# Patient Record
Sex: MALE | Race: WHITE | Employment: FULL TIME
[De-identification: names, ages, dates, MRNs, and addresses within clinical notes are randomized per-mention and may not be internally consistent; named-entity substitution may affect disease eponyms.]

---

## 2017-01-24 ENCOUNTER — TELEPHONE (OUTPATIENT)
Facility: CLINIC | Age: 47
End: 2017-01-24

## 2017-02-09 ENCOUNTER — OFFICE VISIT (OUTPATIENT)
Facility: CLINIC | Age: 47
End: 2017-02-09

## 2017-02-09 VITALS
OXYGEN SATURATION: 98 % | BODY MASS INDEX: 30.29 KG/M2 | WEIGHT: 223.6 LBS | DIASTOLIC BLOOD PRESSURE: 82 MMHG | SYSTOLIC BLOOD PRESSURE: 130 MMHG | HEART RATE: 76 BPM | TEMPERATURE: 97.4 F

## 2017-02-09 DIAGNOSIS — Z79.4 TYPE 2 DIABETES MELLITUS WITH DIABETIC NEUROPATHY, WITH LONG-TERM CURRENT USE OF INSULIN (HCC): Primary | ICD-10-CM

## 2017-02-09 DIAGNOSIS — E66.9 OBESITY (BMI 30.0-34.9): ICD-10-CM

## 2017-02-09 DIAGNOSIS — E55.9 VITAMIN D DEFICIENCY: ICD-10-CM

## 2017-02-09 DIAGNOSIS — E11.40 TYPE 2 DIABETES MELLITUS WITH DIABETIC NEUROPATHY, WITH LONG-TERM CURRENT USE OF INSULIN (HCC): Primary | ICD-10-CM

## 2017-02-09 DIAGNOSIS — E78.5 DYSLIPIDEMIA: ICD-10-CM

## 2017-02-09 PROCEDURE — 4004F PT TOBACCO SCREEN RCVD TLK: CPT | Performed by: FAMILY MEDICINE

## 2017-02-09 PROCEDURE — G8419 CALC BMI OUT NRM PARAM NOF/U: HCPCS | Performed by: FAMILY MEDICINE

## 2017-02-09 PROCEDURE — G8427 DOCREV CUR MEDS BY ELIG CLIN: HCPCS | Performed by: FAMILY MEDICINE

## 2017-02-09 PROCEDURE — G8484 FLU IMMUNIZE NO ADMIN: HCPCS | Performed by: FAMILY MEDICINE

## 2017-02-09 PROCEDURE — 99214 OFFICE O/P EST MOD 30 MIN: CPT | Performed by: FAMILY MEDICINE

## 2017-02-09 PROCEDURE — 3045F PR MOST RECENT HEMOGLOBIN A1C LEVEL 7.0-9.0%: CPT | Performed by: FAMILY MEDICINE

## 2017-02-12 DIAGNOSIS — E11.40 TYPE 2 DIABETES MELLITUS WITH DIABETIC NEUROPATHY, WITH LONG-TERM CURRENT USE OF INSULIN (HCC): ICD-10-CM

## 2017-02-12 DIAGNOSIS — Z79.4 TYPE 2 DIABETES MELLITUS WITH DIABETIC NEUROPATHY, WITH LONG-TERM CURRENT USE OF INSULIN (HCC): ICD-10-CM

## 2017-02-13 RX ORDER — EMPAGLIFLOZIN AND LINAGLIPTIN 10; 5 MG/1; MG/1
TABLET, FILM COATED ORAL
Qty: 30 TABLET | Refills: 3 | Status: SHIPPED | OUTPATIENT
Start: 2017-02-13 | End: 2017-06-12 | Stop reason: SDUPTHER

## 2017-06-12 DIAGNOSIS — Z79.4 TYPE 2 DIABETES MELLITUS WITH DIABETIC NEUROPATHY, WITH LONG-TERM CURRENT USE OF INSULIN (HCC): ICD-10-CM

## 2017-06-12 DIAGNOSIS — E11.40 TYPE 2 DIABETES MELLITUS WITH DIABETIC NEUROPATHY, WITH LONG-TERM CURRENT USE OF INSULIN (HCC): ICD-10-CM

## 2017-06-12 RX ORDER — EMPAGLIFLOZIN AND LINAGLIPTIN 10; 5 MG/1; MG/1
TABLET, FILM COATED ORAL
Qty: 30 TABLET | Refills: 3 | Status: SHIPPED | OUTPATIENT
Start: 2017-06-12 | End: 2017-06-14 | Stop reason: SDUPTHER

## 2017-10-19 ENCOUNTER — HOSPITAL ENCOUNTER (OUTPATIENT)
Age: 47
Setting detail: SPECIMEN
Discharge: HOME | End: 2017-10-19
Payer: COMMERCIAL

## 2017-10-19 LAB
CREATININE URINE: 41.3 MG/DL (ref 39–259)
MICROALBUMIN/CREAT 24H UR: <12 MG/L
MICROALBUMIN/CREAT UR-RTO: 29 MCG/MG CREAT

## 2018-05-29 ENCOUNTER — HOSPITAL ENCOUNTER (OUTPATIENT)
Age: 48
Setting detail: SPECIMEN
Discharge: HOME | End: 2018-05-29
Payer: COMMERCIAL

## 2018-05-29 ENCOUNTER — HOSPITAL ENCOUNTER (OUTPATIENT)
Facility: CLINIC | Age: 48
Discharge: HOME | End: 2018-05-31
Payer: COMMERCIAL

## 2018-05-29 ENCOUNTER — HOSPITAL ENCOUNTER (OUTPATIENT)
Dept: GENERAL RADIOLOGY | Facility: CLINIC | Age: 48
Discharge: HOME | End: 2018-05-31
Payer: COMMERCIAL

## 2018-05-29 DIAGNOSIS — L03.012 CELLULITIS OF FINGER OF LEFT HAND: ICD-10-CM

## 2018-05-29 LAB
ABSOLUTE EOS #: 0.03 K/UL (ref 0–0.44)
ABSOLUTE IMMATURE GRANULOCYTE: <0.03 K/UL (ref 0–0.3)
ABSOLUTE LYMPH #: 1.34 K/UL (ref 1.1–3.7)
ABSOLUTE MONO #: 0.65 K/UL (ref 0.1–1.2)
BASOPHILS # BLD: 0 % (ref 0–2)
BASOPHILS ABSOLUTE: <0.03 K/UL (ref 0–0.2)
DIFFERENTIAL TYPE: ABNORMAL
EOSINOPHILS RELATIVE PERCENT: 0 % (ref 1–4)
HCT VFR BLD CALC: 46.8 % (ref 40.7–50.3)
HEMOGLOBIN: 15.3 G/DL (ref 13–17)
IMMATURE GRANULOCYTES: 0 %
LYMPHOCYTES # BLD: 19 % (ref 24–43)
MCH RBC QN AUTO: 28.1 PG (ref 25.2–33.5)
MCHC RBC AUTO-ENTMCNC: 32.7 G/DL (ref 28.4–34.8)
MCV RBC AUTO: 85.9 FL (ref 82.6–102.9)
MONOCYTES # BLD: 9 % (ref 3–12)
NRBC AUTOMATED: 0 PER 100 WBC
PDW BLD-RTO: 13.1 % (ref 11.8–14.4)
PLATELET # BLD: 145 K/UL (ref 138–453)
PLATELET ESTIMATE: ABNORMAL
PMV BLD AUTO: 11.6 FL (ref 8.1–13.5)
RBC # BLD: 5.45 M/UL (ref 4.21–5.77)
RBC # BLD: ABNORMAL 10*6/UL
SEG NEUTROPHILS: 72 % (ref 36–65)
SEGMENTED NEUTROPHILS ABSOLUTE COUNT: 5.13 K/UL (ref 1.5–8.1)
URIC ACID: 5.9 MG/DL (ref 3.4–7)
WBC # BLD: 7.2 K/UL (ref 3.5–11.3)
WBC # BLD: ABNORMAL 10*3/UL

## 2018-05-29 PROCEDURE — 73140 X-RAY EXAM OF FINGER(S): CPT

## 2018-11-29 ENCOUNTER — HOSPITAL ENCOUNTER (OUTPATIENT)
Age: 48
Setting detail: SPECIMEN
Discharge: HOME | End: 2018-11-29
Payer: COMMERCIAL

## 2018-11-29 DIAGNOSIS — E78.5 DYSLIPIDEMIA: ICD-10-CM

## 2018-11-29 DIAGNOSIS — Z12.5 PROSTATE CANCER SCREENING: ICD-10-CM

## 2018-11-29 LAB
ALBUMIN SERPL-MCNC: 4.3 G/DL (ref 3.5–5.2)
ALBUMIN/GLOBULIN RATIO: 1.6 (ref 1–2.5)
ALP BLD-CCNC: 99 U/L (ref 40–129)
ALT SERPL-CCNC: 18 U/L (ref 5–41)
AST SERPL-CCNC: 17 U/L
BILIRUB SERPL-MCNC: 0.3 MG/DL (ref 0.3–1.2)
BILIRUBIN DIRECT: <0.08 MG/DL
BILIRUBIN, INDIRECT: NORMAL MG/DL (ref 0–1)
GLOBULIN: NORMAL G/DL (ref 1.5–3.8)
TOTAL PROTEIN: 7 G/DL (ref 6.4–8.3)

## 2018-11-30 LAB — PROSTATE SPECIFIC ANTIGEN: 0.82 UG/L

## 2019-07-29 ENCOUNTER — HOSPITAL ENCOUNTER (OUTPATIENT)
Age: 49
Setting detail: SPECIMEN
Discharge: HOME | End: 2019-07-29
Payer: COMMERCIAL

## 2019-07-29 LAB
CREATININE URINE: 41.3 MG/DL (ref 39–259)
MICROALBUMIN/CREAT 24H UR: <12 MG/L
MICROALBUMIN/CREAT UR-RTO: NORMAL MCG/MG CREAT

## 2020-12-09 ENCOUNTER — HOSPITAL ENCOUNTER (OUTPATIENT)
Age: 50
Setting detail: SPECIMEN
Discharge: HOME | End: 2020-12-09
Payer: COMMERCIAL

## 2020-12-09 ENCOUNTER — OFFICE VISIT (OUTPATIENT)
Dept: PRIMARY CARE CLINIC | Age: 50
End: 2020-12-09
Payer: COMMERCIAL

## 2020-12-09 VITALS
OXYGEN SATURATION: 95 % | BODY MASS INDEX: 27.63 KG/M2 | TEMPERATURE: 97.2 F | HEART RATE: 87 BPM | WEIGHT: 204 LBS | HEIGHT: 72 IN

## 2020-12-09 PROCEDURE — 3017F COLORECTAL CA SCREEN DOC REV: CPT | Performed by: FAMILY MEDICINE

## 2020-12-09 PROCEDURE — G8482 FLU IMMUNIZE ORDER/ADMIN: HCPCS | Performed by: FAMILY MEDICINE

## 2020-12-09 PROCEDURE — G8417 CALC BMI ABV UP PARAM F/U: HCPCS | Performed by: FAMILY MEDICINE

## 2020-12-09 PROCEDURE — 99213 OFFICE O/P EST LOW 20 MIN: CPT | Performed by: FAMILY MEDICINE

## 2020-12-09 PROCEDURE — G8427 DOCREV CUR MEDS BY ELIG CLIN: HCPCS | Performed by: FAMILY MEDICINE

## 2020-12-09 PROCEDURE — 4004F PT TOBACCO SCREEN RCVD TLK: CPT | Performed by: FAMILY MEDICINE

## 2020-12-09 ASSESSMENT — ENCOUNTER SYMPTOMS
ABDOMINAL PAIN: 0
VOMITING: 0
WHEEZING: 0
COUGH: 1
SORE THROAT: 1
SHORTNESS OF BREATH: 0
RHINORRHEA: 0
DIARRHEA: 1
CHANGE IN BOWEL HABIT: 1

## 2020-12-09 ASSESSMENT — PATIENT HEALTH QUESTIONNAIRE - PHQ9
SUM OF ALL RESPONSES TO PHQ QUESTIONS 1-9: 0
1. LITTLE INTEREST OR PLEASURE IN DOING THINGS: 0
SUM OF ALL RESPONSES TO PHQ QUESTIONS 1-9: 0
SUM OF ALL RESPONSES TO PHQ QUESTIONS 1-9: 0
2. FEELING DOWN, DEPRESSED OR HOPELESS: 0
SUM OF ALL RESPONSES TO PHQ9 QUESTIONS 1 & 2: 0

## 2020-12-09 NOTE — PROGRESS NOTES
[address] CLINIC  [facility] [address] [address]  Dept: [phone]  Dept Fax: [phone]    Alcides Poe is a 44 y.o. male who presents today for his medical conditions/complaintsas noted below. Alcides Poe is c/o of Concern For COVID-19 (sore throat,congestion,diarrhea,fatigue,x3 days,no exposure)        HPI:     Pharyngitis   This is a new problem. The current episode started in the past 7 days (3 days). The problem occurs constantly. The problem has been unchanged. Associated symptoms include a change in bowel habit, congestion, coughing, fatigue, a fever, headaches and a sore throat. Pertinent negatives include no abdominal pain, chills, myalgias, rash or vomiting. Nothing aggravates the symptoms. He has tried acetaminophen (nyquil) for the symptoms. The treatment provided mild relief. No known COVID contacts  PMHx of DM  Past Medical History:   Diagnosis Date    Diabetic peripheral neuropathy ([facility] [address] 1/14/2014    Type II or unspecified type diabetes mellitus without mention of complication, not stated as uncontrolled     Past medical history reviewed and pertinent positives/negatives in the HPI    Past Surgical History:   Procedure Laterality Date    HAND REPLANTATION Left 6/7/89       Family History   Problem Relation Age of Onset    Diabetes Mother     Heart Disease Father     Cancer Father     Diabetes Maternal Grandmother        Social History     Tobacco Use    Smoking status: Never Smoker    Smokeless tobacco: Current User     Types: Chew    Tobacco comment: patient is not ready to quit   Substance Use Topics    Alcohol use:  Yes     Alcohol/week: 0.0 standard drinks     Comment: ossacionally      Current Outpatient Medications   Medication Sig Dispense Refill    Empagliflozin-linaGLIPtin (GLYXAMBI) 10-5 MG TABS take 1 tablet by mouth once daily 30 tablet 2    Insulin Glargine, 2 Unit Dial, (TOUJEO MAX SOLOSTAR) 300 UNIT/ML SOPN Inject 75 Units into the skin daily 3 pen 3    dapagliflozin (FARXIGA) 10 MG tablet Take 1 tablet by mouth every morning 90 tablet 1    tiZANidine (ZANAFLEX) 4 MG tablet Take 1 tablet by mouth nightly as needed (muscle spasms) 30 tablet 2    ondansetron (ZOFRAN ODT) 4 MG disintegrating tablet Take 1 tablet by mouth every 8 hours as needed for Nausea or Vomiting 30 tablet 0    pregabalin (LYRICA) 50 MG capsule Take 1 capsule by mouth 3 times daily 90 capsule 0    Insulin Syringe-Needle U-100 31G X 5/16\" 1 ML MISC 1 each by Does not apply route daily. 100 each 3    Insulin Pen Needle 29G X 12.7MM MISC 1 each by Does not apply route daily. 100 each 3    pregabalin (LYRICA) 50 MG capsule Take 1 capsule by mouth 3 times daily for 14 days. 42 capsule 0     No current facility-administered medications for this visit. No Known Allergies    Health Maintenance   Topic Date Due    Diabetic retinal exam  05/25/2017    Lipid screen  05/25/2017    Colon cancer screen colonoscopy  06/30/2018    Diabetic microalbuminuria test  07/29/2020    A1C test (Diabetic or Prediabetic)  12/16/2020    DTaP/Tdap/Td vaccine (1 - Tdap) 02/12/2021 (Originally 6/30/1987)    Hepatitis B vaccine (1 of 3 - Risk 3-dose series) 09/15/2021 (Originally [birth year]/30/1987)    Shingles Vaccine (1 of 2) 09/15/2021 (Originally 6/30/2018)    Diabetic foot exam  09/16/2021    Flu vaccine  Completed    HIV screen  Addressed    Hepatitis A vaccine  Aged Out    Hib vaccine  Aged Out    Meningococcal (ACWY) vaccine  Aged Out    Pneumococcal 0-64 years Vaccine  Aged Out       :      Review of Systems   Constitutional: Positive for fatigue and fever. Negative for chills. HENT: Positive for congestion and sore throat. Negative for ear pain and rhinorrhea. Respiratory: Positive for cough. Negative for shortness of breath and wheezing. Gastrointestinal: Positive for change in bowel habit and diarrhea.  Negative for abdominal pain and vomiting. Musculoskeletal: Negative for myalgias. Skin: Negative for pallor and rash. Neurological: Positive for headaches. Hematological: Negative for adenopathy. Objective:     Physical Exam  Vitals signs and nursing note reviewed. Constitutional:       Appearance: Normal appearance. HENT:      Head: Normocephalic and atraumatic. Right Ear: Hearing normal.      Left Ear: Hearing normal.      Nose: Nose normal.      Mouth/Throat:      Lips: Pink. Mouth: Mucous membranes are moist.      Pharynx: Oropharynx is clear. Eyes:      Extraocular Movements: Extraocular movements intact. Conjunctiva/sclera: Conjunctivae normal.   Neck:      Musculoskeletal: Normal range of motion. Cardiovascular:      Rate and Rhythm: Normal rate and regular rhythm. Heart sounds: Normal heart sounds. Pulmonary:      Effort: Pulmonary effort is normal.      Breath sounds: Normal breath sounds. Skin:     General: Skin is warm and dry. Neurological:      Mental Status: He is alert and oriented to person, place, and time. Mental status is at baseline. Psychiatric:         Mood and Affect: Mood normal.         Behavior: Behavior normal.         Thought Content: Thought content normal.         Judgment: Judgment normal.       Pulse 87   Temp 97.2 °F (36.2 °C) (Infrared)   Ht 6' (1.829 m)   Wt 204 lb (92.5 kg)   SpO2 95%   BMI 27.67 kg/m²     Assessment:       Diagnosis Orders   1. Suspected COVID-19 virus infection  COVID-19 Ambulatory   2. Viral illness         Plan: You were tested for COVID today. We will call you with results when they are available. If you have not heard from us in 7 days, please call our office. Advance Care Planning  People with COVID-19 may have no symptoms, mild symptoms, such as fever, cough, and shortness of breath or they may have more severe illness, developing severe and fatal pneumonia.   As a result, Advance Care Planning with attention to naming a health care decision maker (someone you trust to make healthcare decisions for you if you could not speak for yourself) and sharing other health care preferences is important BEFORE a possible health crisis. Please contact your Primary Care Provider to discuss Advance Care Planning. Preventing the Spread of Coronavirus Disease 2019 in Homes and Residential Communities  For the most recent information go to [email or web address]    Prevention steps for People with confirmed or suspected COVID-19 (including persons under investigation) who do not need to be hospitalized  and   People with confirmed COVID-19 who were hospitalized and determined to be medically stable to go home    Your healthcare provider and public health staff will evaluate whether you can be cared for at home. If it is determined that you do not need to be hospitalized and can be isolated at home, you will be monitored by staff from your local or state health department. You should follow the prevention steps below until a healthcare provider or local or state health department says you can return to your normal activities. Stay home except to get medical care  People who are mildly ill with COVID-19 are able to isolate at home during their illness. You should restrict activities outside your home, except for getting medical care. Do not go to work, school, or public areas. Avoid using public transportation, ride-sharing, or taxis. Separate yourself from other people and animals in your home  People: As much as possible, you should stay in a specific room and away from other people in your home. Also, you should use a separate bathroom, if available. Animals: You should restrict contact with pets and other animals while you are sick with COVID-19, just like you would around other people.  Although there have not been reports of pets or other animals becoming sick with COVID-19, it is still recommended that people sick with COVID-19 limit contact with animals until more information is known about the virus. When possible, have another member of your household care for your animals while you are sick. If you are sick with COVID-19, avoid contact with your pet, including petting, snuggling, being kissed or licked, and sharing food. If you must care for your pet or be around animals while you are sick, wash your hands before and after you interact with pets and wear a facemask. Call ahead before visiting your doctor  If you have a medical appointment, call the healthcare provider and tell them that you have or may have COVID-19. This will help the healthcare providers office take steps to keep other people from getting infected or exposed. Wear a facemask  You should wear a facemask when you are around other people (e.g., sharing a room or vehicle) or pets and before you enter a healthcare providers office. If you are not able to wear a facemask (for example, because it causes trouble breathing), then people who live with you should not stay in the same room with you, or they should wear a facemask if they enter your room. Cover your coughs and sneezes  Cover your mouth and nose with a tissue when you cough or sneeze. Throw used tissues in a lined trash can. Immediately wash your hands with soap and water for at least 20 seconds or, if soap and water are not available, clean your hands with an alcohol-based hand  that contains at least 60% alcohol. Clean your hands often  Wash your hands often with soap and water for at least 20 seconds, especially after blowing your nose, coughing, or sneezing; going to the bathroom; and before eating or preparing food. If soap and water are not readily available, use an alcohol-based hand  with at least 60% alcohol, covering all surfaces of your hands and rubbing them together until they feel dry.   Soap and water are the best option if hands are visibly dirty. Avoid touching your eyes, nose, and mouth with unwashed hands. Avoid sharing personal household items  You should not share dishes, drinking glasses, cups, eating utensils, towels, or bedding with other people or pets in your home. After using these items, they should be washed thoroughly with soap and water. Clean all high-touch surfaces everyday  High touch surfaces include counters, tabletops, doorknobs, bathroom fixtures, toilets, phones, keyboards, tablets, and bedside tables. Also, clean any surfaces that may have blood, stool, or body fluids on them. Use a household cleaning spray or wipe, according to the label instructions. Labels contain instructions for safe and effective use of the cleaning product including precautions you should take when applying the product, such as wearing gloves and making sure you have good ventilation during use of the product. Monitor your symptoms  Seek prompt medical attention if your illness is worsening (e.g., difficulty breathing). Before seeking care, call your healthcare provider and tell them that you have, or are being evaluated for, COVID-19. Put on a facemask before you enter the facility. These steps will help the healthcare providers office to keep other people in the office or waiting room from getting infected or exposed. Ask your healthcare provider to call the local or state health department. Persons who are placed under active monitoring or facilitated self-monitoring should follow instructions provided by their local health department or occupational health professionals, as appropriate. When working with your local health department check their available hours. If you have a medical emergency and need to call 911, notify the dispatch personnel that you have, or are being evaluated for COVID-19. If possible, put on a facemask before emergency medical services arrive.   Discontinuing home isolation  Patients with confirmed COVID-19 should

## 2020-12-09 NOTE — PATIENT INSTRUCTIONS
You were tested for COVID today. We will call you with results when they are available. If you have not heard from us in 7 days, please call our office. Advance Care Planning  People with COVID-19 may have no symptoms, mild symptoms, such as fever, cough, and shortness of breath or they may have more severe illness, developing severe and fatal pneumonia. As a result, Advance Care Planning with attention to naming a health care decision maker (someone you trust to make healthcare decisions for you if you could not speak for yourself) and sharing other health care preferences is important BEFORE a possible health crisis. Please contact your Primary Care Provider to discuss Advance Care Planning. Preventing the Spread of Coronavirus Disease 2019 in Homes and Residential Communities  For the most recent information go to [email or web address]    Prevention steps for People with confirmed or suspected COVID-19 (including persons under investigation) who do not need to be hospitalized  and   People with confirmed COVID-19 who were hospitalized and determined to be medically stable to go home    Your healthcare provider and public health staff will evaluate whether you can be cared for at home. If it is determined that you do not need to be hospitalized and can be isolated at home, you will be monitored by staff from your local or state health department. You should follow the prevention steps below until a healthcare provider or local or state health department says you can return to your normal activities. Stay home except to get medical care  People who are mildly ill with COVID-19 are able to isolate at home during their illness. You should restrict activities outside your home, except for getting medical care. Do not go to work, school, or public areas. Avoid using public transportation, ride-sharing, or taxis.   Separate yourself from other people and animals in your home  People: As much as possible, you should stay in a specific room and away from other people in your home. Also, you should use a separate bathroom, if available. Animals: You should restrict contact with pets and other animals while you are sick with COVID-19, just like you would around other people. Although there have not been reports of pets or other animals becoming sick with COVID-19, it is still recommended that people sick with COVID-19 limit contact with animals until more information is known about the virus. When possible, have another member of your household care for your animals while you are sick. If you are sick with COVID-19, avoid contact with your pet, including petting, snuggling, being kissed or licked, and sharing food. If you must care for your pet or be around animals while you are sick, wash your hands before and after you interact with pets and wear a facemask. Call ahead before visiting your doctor  If you have a medical appointment, call the healthcare provider and tell them that you have or may have COVID-19. This will help the healthcare providers office take steps to keep other people from getting infected or exposed. Wear a facemask  You should wear a facemask when you are around other people (e.g., sharing a room or vehicle) or pets and before you enter a healthcare providers office. If you are not able to wear a facemask (for example, because it causes trouble breathing), then people who live with you should not stay in the same room with you, or they should wear a facemask if they enter your room. Cover your coughs and sneezes  Cover your mouth and nose with a tissue when you cough or sneeze. Throw used tissues in a lined trash can. Immediately wash your hands with soap and water for at least 20 seconds or, if soap and water are not available, clean your hands with an alcohol-based hand  that contains at least 60% alcohol.   Clean your hands often  Wash your hands often with soap and water for at least 20 seconds, especially after blowing your nose, coughing, or sneezing; going to the bathroom; and before eating or preparing food. If soap and water are not readily available, use an alcohol-based hand  with at least 60% alcohol, covering all surfaces of your hands and rubbing them together until they feel dry. Soap and water are the best option if hands are visibly dirty. Avoid touching your eyes, nose, and mouth with unwashed hands. Avoid sharing personal household items  You should not share dishes, drinking glasses, cups, eating utensils, towels, or bedding with other people or pets in your home. After using these items, they should be washed thoroughly with soap and water. Clean all high-touch surfaces everyday  High touch surfaces include counters, tabletops, doorknobs, bathroom fixtures, toilets, phones, keyboards, tablets, and bedside tables. Also, clean any surfaces that may have blood, stool, or body fluids on them. Use a household cleaning spray or wipe, according to the label instructions. Labels contain instructions for safe and effective use of the cleaning product including precautions you should take when applying the product, such as wearing gloves and making sure you have good ventilation during use of the product. Monitor your symptoms  Seek prompt medical attention if your illness is worsening (e.g., difficulty breathing). Before seeking care, call your healthcare provider and tell them that you have, or are being evaluated for, COVID-19. Put on a facemask before you enter the facility. These steps will help the healthcare providers office to keep other people in the office or waiting room from getting infected or exposed. Ask your healthcare provider to call the local or state health department.  Persons who are placed under active monitoring or facilitated self-monitoring should follow instructions provided by their

## 2020-12-13 LAB — SARS-COV-2, NAA: DETECTED

## 2020-12-14 ENCOUNTER — TELEPHONE (OUTPATIENT)
Dept: ADMINISTRATIVE | Age: 50
End: 2020-12-14

## 2020-12-14 ENCOUNTER — TELEPHONE (OUTPATIENT)
Dept: PRIMARY CARE CLINIC | Age: 50
End: 2020-12-14

## 2021-03-23 ENCOUNTER — IMMUNIZATION (OUTPATIENT)
Dept: PRIMARY CARE CLINIC | Age: 51
End: 2021-03-23
Payer: COMMERCIAL

## 2021-03-23 PROCEDURE — 91300 COVID-19, PFIZER VACCINE 30MCG/0.3ML DOSE: CPT | Performed by: INTERNAL MEDICINE

## 2021-03-23 PROCEDURE — 0001A COVID-19, PFIZER VACCINE 30MCG/0.3ML DOSE: CPT | Performed by: INTERNAL MEDICINE

## 2021-04-13 ENCOUNTER — IMMUNIZATION (OUTPATIENT)
Dept: PRIMARY CARE CLINIC | Age: 51
End: 2021-04-13
Payer: COMMERCIAL

## 2021-04-13 PROCEDURE — 91300 COVID-19, PFIZER VACCINE 30MCG/0.3ML DOSE: CPT | Performed by: INTERNAL MEDICINE

## 2021-04-13 PROCEDURE — 0002A COVID-19, PFIZER VACCINE 30MCG/0.3ML DOSE: CPT | Performed by: INTERNAL MEDICINE

## 2021-06-15 ENCOUNTER — HOSPITAL ENCOUNTER (EMERGENCY)
Age: 51
Discharge: HOME | End: 2021-06-15
Attending: EMERGENCY MEDICINE
Payer: COMMERCIAL

## 2021-06-15 ENCOUNTER — NURSE TRIAGE (OUTPATIENT)
Dept: OTHER | Facility: CLINIC | Age: 51
End: 2021-06-15

## 2021-06-15 VITALS
WEIGHT: 215 LBS | OXYGEN SATURATION: 98 % | SYSTOLIC BLOOD PRESSURE: 151 MMHG | HEART RATE: 100 BPM | BODY MASS INDEX: 29.12 KG/M2 | TEMPERATURE: 97.2 F | DIASTOLIC BLOOD PRESSURE: 91 MMHG | RESPIRATION RATE: 18 BRPM | HEIGHT: 72 IN

## 2021-06-15 DIAGNOSIS — R60.0 LEG EDEMA: Primary | ICD-10-CM

## 2021-06-15 DIAGNOSIS — L97.509 ULCER OF TOE, UNSPECIFIED LATERALITY, UNSPECIFIED ULCER STAGE (HCC): ICD-10-CM

## 2021-06-15 LAB
ALBUMIN SERPL-MCNC: 4 G/DL (ref 3.5–5.2)
ALBUMIN/GLOBULIN RATIO: ABNORMAL (ref 1–2.5)
ALP BLD-CCNC: 125 U/L (ref 40–129)
ALT SERPL-CCNC: 17 U/L (ref 5–41)
ANION GAP SERPL CALCULATED.3IONS-SCNC: 12 MMOL/L (ref 9–17)
AST SERPL-CCNC: 17 U/L
BILIRUB SERPL-MCNC: 0.65 MG/DL (ref 0.3–1.2)
BUN BLDV-MCNC: 12 MG/DL (ref 6–20)
BUN/CREAT BLD: ABNORMAL (ref 9–20)
CALCIUM SERPL-MCNC: 9.2 MG/DL (ref 8.6–10.4)
CHLORIDE BLD-SCNC: 97 MMOL/L (ref 98–107)
CO2: 26 MMOL/L (ref 20–31)
CREAT SERPL-MCNC: 0.82 MG/DL (ref 0.7–1.2)
D-DIMER QUANTITATIVE: 0.31 MG/L FEU (ref 0–0.59)
GFR AFRICAN AMERICAN: >60 ML/MIN
GFR NON-AFRICAN AMERICAN: >60 ML/MIN
GFR SERPL CREATININE-BSD FRML MDRD: ABNORMAL ML/MIN/{1.73_M2}
GFR SERPL CREATININE-BSD FRML MDRD: ABNORMAL ML/MIN/{1.73_M2}
GLUCOSE BLD-MCNC: 374 MG/DL (ref 70–99)
POTASSIUM SERPL-SCNC: 4.1 MMOL/L (ref 3.7–5.3)
SODIUM BLD-SCNC: 135 MMOL/L (ref 135–144)
TOTAL PROTEIN: 7.1 G/DL (ref 6.4–8.3)
TSH SERPL DL<=0.05 MIU/L-ACNC: 0.39 MIU/L (ref 0.3–5)

## 2021-06-15 PROCEDURE — 84443 ASSAY THYROID STIM HORMONE: CPT

## 2021-06-15 PROCEDURE — 36415 COLL VENOUS BLD VENIPUNCTURE: CPT

## 2021-06-15 PROCEDURE — 85379 FIBRIN DEGRADATION QUANT: CPT

## 2021-06-15 PROCEDURE — 99283 EMERGENCY DEPT VISIT LOW MDM: CPT

## 2021-06-15 PROCEDURE — 80053 COMPREHEN METABOLIC PANEL: CPT

## 2021-06-15 RX ORDER — FUROSEMIDE 20 MG/1
20 TABLET ORAL DAILY
Qty: 4 TABLET | Refills: 0 | Status: SHIPPED | OUTPATIENT
Start: 2021-06-15 | End: 2021-06-18 | Stop reason: ALTCHOICE

## 2021-06-15 ASSESSMENT — ENCOUNTER SYMPTOMS
ABDOMINAL PAIN: 0
COUGH: 0
BACK PAIN: 0

## 2021-06-15 NOTE — TELEPHONE ENCOUNTER
Received call from Clark [facility] with [employer]. Brief description of triage: See below    Triage indicates for patient to go to [address] [place], [place] [address]r [facility] now (Or to office with PCP approval). 2nd level triage completed with Mercedes Murphy NP; provider recommends patient be seen by PCP today in the office. Advised ER if no available appts. Advised patient if he develops CP or SOB to call . Care advice provided, patient verbalizes understanding; denies any other questions or concerns; instructed to call back for any new or worsening symptoms. Writer provided warm transfer to Miky at [facility] for appointment scheduling. Attention Provider: Thank you for allowing me to participate in the care of your patient. The patient was connected to triage in response to information provided to the [facility]. Please do not respond through this encounter as the response is not directed to a shared pool. Reason for Disposition   Thigh or calf pain in only one leg and present > 1 hour    Answer Assessment - Initial Assessment Questions  1. ONSET: \"When did the pain start? \"       Yesterday    2. LOCATION: \"Where is the pain located? \"       R leg- foot up to the inner thigh    3. PAIN: \"How bad is the pain? \"    (Scale 1-10; or mild, moderate, severe)    -  MILD (1-3): doesn't interfere with normal activities     -  MODERATE (4-7): interferes with normal activities (e.g., work or school) or awakens from sleep, limping     -  SEVERE (8-10): excruciating pain, unable to do any normal activities, unable to walk      Moderate/severe    4. WORK OR EXERCISE: \"Has there been any recent work or exercise that involved this part of the body? \"       Denies    5. CAUSE: \"What do you think is causing the leg pain? \"      Unsure    6. OTHER SYMPTOMS: \"Do you have any other symptoms? \" (e.g., chest pain, back pain, breathing difficulty, swelling, rash, fever, numbness, weakness)      Warmth to the R \"inside leg\", bilateral ankle swelling, blisters to both great toes, chills at night, redness and warmth to R inner calf    7. PREGNANCY: \"Is there any chance you are pregnant? \" \"When was your last menstrual period? \"      NA    Protocols used: LEG PAIN-ADULT-[place]

## 2021-06-15 NOTE — ED PROVIDER NOTES
[address]  EMERGENCY DEPARTMENT ENCOUNTER      Pt Name: Lg Perrin  MRN: [id number]  [place] 1970  Date of evaluation: 6/15/21      CHIEF COMPLAINT       Chief Complaint   Patient presents with    Foot Pain    Leg Swelling     HISTORY OF PRESENT ILLNESS   HPI 44 y.o. male presents with c/o bilateral leg edema. Pt reports that he was recently on vacation in [place]. He reports that he drove there about 3 weeks ago. For the last week he has been having bilateral leg swelling. He reports that he is having moderate pain shooting up his leg, more so on the right side. He does state that he was doing a lot of walking wearing sandals when he is in [place] and then he did notice that he has some blisters on the bottom of his toes. He has diabetic peripheral neuropathy. No redness. No fevers. The swelling in his legs seems to be better in the morning, worsened as the day goes on. REVIEW OF SYSTEMS       Review of Systems   Constitutional: Negative for fever. HENT: Negative for dental problem. Eyes: Negative for visual disturbance. Respiratory: Negative for cough. Cardiovascular: Positive for leg swelling. Negative for chest pain. Gastrointestinal: Negative for abdominal pain. Genitourinary: Negative for difficulty urinating. Musculoskeletal: Negative for back pain. Skin: Positive for wound. Negative for rash. Neurological: Positive for numbness (Chronic bilateral peripheral neuropathy).        PAST MEDICAL HISTORY     Past Medical History:   Diagnosis Date    Diabetic peripheral neuropathy ([facility] [address] 1/14/2014    Type II or unspecified type diabetes mellitus without mention of complication, not stated as uncontrolled        SURGICAL HISTORY       Past Surgical History:   Procedure Laterality Date    HAND REPLANTATION Left 6/7/89       CURRENT MEDICATIONS       Discharge Medication List as of 6/15/2021  3:37 PM      CONTINUE these medications which have NOT CHANGED    Details Insulin Glargine, 2 Unit Dial, (TOUJEO MAX SOLOSTAR) 300 UNIT/ML SOPN Inject 75 Units into the skin daily, Disp-3 pen,R-3Adjust Sig      !! pregabalin (LYRICA) 50 MG capsule Take 1 capsule by mouth 3 times daily, Disp-90 capsule, R-0      !! pregabalin (LYRICA) 50 MG capsule Take 1 capsule by mouth 3 times daily for 14 days. , Disp-42 capsule, R-0Print      Empagliflozin-linaGLIPtin (GLYXAMBI) 10-5 MG TABS take 1 tablet by mouth once daily, Disp-30 tablet,R-2Normal      dapagliflozin (FARXIGA) 10 MG tablet Take 1 tablet by mouth every morning, Disp-90 tablet, R-1Normal      tiZANidine (ZANAFLEX) 4 MG tablet Take 1 tablet by mouth nightly as needed (muscle spasms), Disp-30 tablet, R-2Normal      ondansetron (ZOFRAN ODT) 4 MG disintegrating tablet Take 1 tablet by mouth every 8 hours as needed for Nausea or Vomiting, Disp-30 tablet, R-0Normal      Insulin Syringe-Needle U-100 31G X 5/16\" 1 ML MISC DAILY Starting 2014, Until Discontinued, Disp-100 each, R-3, Print      Insulin Pen Needle 29G X 12.7MM MISC DAILY Starting 2014, Until Discontinued, Disp-100 each, R-3, Normal       !! - Potential duplicate medications found. Please discuss with provider. ALLERGIES     has No Known Allergies. FAMILY HISTORY     He indicated that his mother is alive. He indicated that his father is . He indicated that his maternal grandmother is . He indicated that his maternal grandfather is . He indicated that his paternal grandmother is . He indicated that his paternal grandfather is . SOCIAL HISTORY      reports that he has never smoked. His smokeless tobacco use includes chew. He reports current alcohol use.     PHYSICAL EXAM     INITIAL VITALS: BP (!) 151/91   Pulse 100   Temp 97.2 °F (36.2 °C) (Temporal)   Resp 18   Ht 6' (1.829 m)   Wt 215 lb (97.5 kg)   SpO2 98%   BMI 29.16 kg/m²   Gen: NAD  Head: Normocephalic, atraumatic  Eye: Pupils equal round reactive to Medications    furosemide (LASIX) 20 MG tablet     Sig: Take 1 tablet by mouth daily for 4 days     Dispense:  4 tablet     Refill:  0     -------------------------  CRITICAL CARE:   CONSULTS: None  PROCEDURES: Procedures     FINAL IMPRESSION      1. Leg edema    2.  Ulcer of toe, unspecified laterality, unspecified ulcer stage [facility])          DISPOSITION/PLAN   DISPOSITION Decision To Discharge 06/15/2021 03:33:46 PM      PATIENT REFERRED TO:  Teagan Correa MD  [address] [facility] [id number]  [address] [facility] [address] [facility] [address]  [address] [place] [address]  [phone]    If symptoms worsen      DISCHARGE MEDICATIONS:  Discharge Medication List as of 6/15/2021  3:37 PM      START taking these medications    Details   furosemide (LASIX) 20 MG tablet Take 1 tablet by mouth daily for 4 days, Disp-4 tablet, R-0Print               Monica Park MD  Attending Emergency Physician                      Monica Park MD  06/15/21 [address] [facility] Joe Davies MD  06/15/21 [phone]

## 2021-06-18 ENCOUNTER — HOSPITAL ENCOUNTER (OUTPATIENT)
Age: 51
Setting detail: SPECIMEN
Discharge: HOME | End: 2021-06-18
Payer: COMMERCIAL

## 2021-06-18 DIAGNOSIS — M25.471 EDEMA OF BOTH ANKLES: ICD-10-CM

## 2021-06-18 DIAGNOSIS — M25.472 EDEMA OF BOTH ANKLES: ICD-10-CM

## 2021-06-18 DIAGNOSIS — R60.0 EDEMA OF BOTH FEET: ICD-10-CM

## 2021-06-18 LAB
SEDIMENTATION RATE, ERYTHROCYTE: 7 MM (ref 0–20)
URIC ACID: 5.7 MG/DL (ref 3.4–7)

## 2021-06-25 ENCOUNTER — HOSPITAL ENCOUNTER (OUTPATIENT)
Age: 51
Setting detail: SPECIMEN
Discharge: HOME | End: 2021-06-25
Payer: COMMERCIAL

## 2021-06-25 DIAGNOSIS — E78.5 DYSLIPIDEMIA: ICD-10-CM

## 2021-06-25 LAB
CHOLESTEROL, FASTING: 148 MG/DL
CHOLESTEROL/HDL RATIO: 3.4
HDLC SERPL-MCNC: 43 MG/DL
LDL CHOLESTEROL: 91 MG/DL (ref 0–130)
TRIGLYCERIDE, FASTING: 71 MG/DL
VLDLC SERPL CALC-MCNC: NORMAL MG/DL (ref 1–30)

## 2021-07-08 ENCOUNTER — HOSPITAL ENCOUNTER (OUTPATIENT)
Age: 51
Setting detail: SPECIMEN
Discharge: HOME | End: 2021-07-08
Payer: COMMERCIAL

## 2021-07-08 ENCOUNTER — NURSE TRIAGE (OUTPATIENT)
Dept: OTHER | Facility: CLINIC | Age: 51
End: 2021-07-08

## 2021-07-08 DIAGNOSIS — L03.115 CELLULITIS OF RIGHT LOWER EXTREMITY: ICD-10-CM

## 2021-07-08 DIAGNOSIS — R60.0 EDEMA OF RIGHT FOOT: ICD-10-CM

## 2021-07-08 DIAGNOSIS — R60.0 EDEMA OF RIGHT LOWER LEG: ICD-10-CM

## 2021-07-08 DIAGNOSIS — L97.511 SKIN ULCER OF TOE OF RIGHT FOOT, LIMITED TO BREAKDOWN OF SKIN (HCC): ICD-10-CM

## 2021-07-08 DIAGNOSIS — M25.471 EDEMA OF RIGHT ANKLE: ICD-10-CM

## 2021-07-08 LAB
ABSOLUTE EOS #: 0.03 K/UL (ref 0–0.44)
ABSOLUTE IMMATURE GRANULOCYTE: 0.03 K/UL (ref 0–0.3)
ABSOLUTE LYMPH #: 1.73 K/UL (ref 1.1–3.7)
ABSOLUTE MONO #: 0.7 K/UL (ref 0.1–1.2)
ALBUMIN SERPL-MCNC: 4 G/DL (ref 3.5–5.2)
ALBUMIN/GLOBULIN RATIO: 1.2 (ref 1–2.5)
ALP BLD-CCNC: 121 U/L (ref 40–129)
ALT SERPL-CCNC: 17 U/L (ref 5–41)
ANION GAP SERPL CALCULATED.3IONS-SCNC: 8 MMOL/L (ref 9–17)
AST SERPL-CCNC: 17 U/L
BASOPHILS # BLD: 0 % (ref 0–2)
BASOPHILS ABSOLUTE: <0.03 K/UL (ref 0–0.2)
BILIRUB SERPL-MCNC: 0.45 MG/DL (ref 0.3–1.2)
BUN BLDV-MCNC: 23 MG/DL (ref 6–20)
BUN/CREAT BLD: ABNORMAL (ref 9–20)
C-REACTIVE PROTEIN: 33.1 MG/L (ref 0–5)
CALCIUM SERPL-MCNC: 9.1 MG/DL (ref 8.6–10.4)
CHLORIDE BLD-SCNC: 100 MMOL/L (ref 98–107)
CO2: 25 MMOL/L (ref 20–31)
CREAT SERPL-MCNC: 0.75 MG/DL (ref 0.7–1.2)
D-DIMER QUANTITATIVE: 0.38 MG/L FEU
DIFFERENTIAL TYPE: ABNORMAL
EOSINOPHILS RELATIVE PERCENT: 0 % (ref 1–4)
GFR AFRICAN AMERICAN: >60 ML/MIN
GFR NON-AFRICAN AMERICAN: >60 ML/MIN
GFR SERPL CREATININE-BSD FRML MDRD: ABNORMAL ML/MIN/{1.73_M2}
GFR SERPL CREATININE-BSD FRML MDRD: ABNORMAL ML/MIN/{1.73_M2}
GLUCOSE BLD-MCNC: 327 MG/DL (ref 70–99)
HCT VFR BLD CALC: 43.2 % (ref 40.7–50.3)
HEMOGLOBIN: 14.1 G/DL (ref 13–17)
IMMATURE GRANULOCYTES: 0 %
LYMPHOCYTES # BLD: 17 % (ref 24–43)
MCH RBC QN AUTO: 27.4 PG (ref 25.2–33.5)
MCHC RBC AUTO-ENTMCNC: 32.6 G/DL (ref 28.4–34.8)
MCV RBC AUTO: 83.9 FL (ref 82.6–102.9)
MONOCYTES # BLD: 7 % (ref 3–12)
NRBC AUTOMATED: 0 PER 100 WBC
PDW BLD-RTO: 12.8 % (ref 11.8–14.4)
PLATELET # BLD: 221 K/UL (ref 138–453)
PLATELET ESTIMATE: ABNORMAL
PMV BLD AUTO: 10.2 FL (ref 8.1–13.5)
POTASSIUM SERPL-SCNC: 4.7 MMOL/L (ref 3.7–5.3)
RBC # BLD: 5.15 M/UL (ref 4.21–5.77)
RBC # BLD: ABNORMAL 10*6/UL
SEDIMENTATION RATE, ERYTHROCYTE: 26 MM (ref 0–20)
SEG NEUTROPHILS: 76 % (ref 36–65)
SEGMENTED NEUTROPHILS ABSOLUTE COUNT: 7.6 K/UL (ref 1.5–8.1)
SODIUM BLD-SCNC: 133 MMOL/L (ref 135–144)
TOTAL PROTEIN: 7.3 G/DL (ref 6.4–8.3)
URIC ACID: 5.4 MG/DL (ref 3.4–7)
WBC # BLD: 10.1 K/UL (ref 3.5–11.3)
WBC # BLD: ABNORMAL 10*3/UL

## 2021-07-08 PROCEDURE — 85652 RBC SED RATE AUTOMATED: CPT

## 2021-07-08 PROCEDURE — 80053 COMPREHEN METABOLIC PANEL: CPT

## 2021-07-08 PROCEDURE — 84550 ASSAY OF BLOOD/URIC ACID: CPT

## 2021-07-08 PROCEDURE — 85025 COMPLETE CBC W/AUTO DIFF WBC: CPT

## 2021-07-08 PROCEDURE — 85379 FIBRIN DEGRADATION QUANT: CPT

## 2021-07-08 PROCEDURE — 86140 C-REACTIVE PROTEIN: CPT

## 2021-07-08 PROCEDURE — 36415 COLL VENOUS BLD VENIPUNCTURE: CPT

## 2021-07-08 NOTE — TELEPHONE ENCOUNTER
Received call from Kelechi Nichols at [facility] with [employer]. Brief description of triage: patient states that he has swelling in his right foot and pain in his right leg, it started around June 13 th. Triage indicates for patient to be seen in the office today    Care advice provided, patient verbalizes understanding; denies any other questions or concerns; instructed to call back for any new or worsening symptoms. Writer provided warm transfer to Nohemy Serrato at [facility] for appointment scheduling. Attention Provider: Thank you for allowing me to participate in the care of your patient. The patient was connected to triage in response to information provided to the [facility]. Please do not respond through this encounter as the response is not directed to a shared pool. Reason for Disposition   SEVERE pain (e.g., excruciating, unable to do any normal activities)    Answer Assessment - Initial Assessment Questions  1. ONSET: \"When did the pain start? \"       See above note    2. LOCATION: \"Where is the pain located? \"       See above note    3. PAIN: \"How bad is the pain? \"    (Scale 1-10; or mild, moderate, severe)    -  MILD (1-3): doesn't interfere with normal activities     -  MODERATE (4-7): interferes with normal activities (e.g., work or school) or awakens from sleep, limping     -  SEVERE (8-10): excruciating pain, unable to do any normal activities, unable to walk      6, shoots up into patients thigh from his foot, becomes sharper when he walks    4. WORK OR EXERCISE: \"Has there been any recent work or exercise that involved this part of the body? \"       His job    5. CAUSE: \"What do you think is causing the foot pain? \"      Unknown    6. OTHER SYMPTOMS: \"Do you have any other symptoms? \" (e.g., leg pain, rash, fever, numbness)      See above note    7. PREGNANCY: \"Is there any chance you are pregnant? \" \"When was your last menstrual period? \"      N/A    Protocols used: FOOT PAIN-ADULT-[place]

## 2021-07-21 ENCOUNTER — NURSE TRIAGE (OUTPATIENT)
Dept: OTHER | Facility: CLINIC | Age: 51
End: 2021-07-21

## 2021-07-21 NOTE — TELEPHONE ENCOUNTER
Received call from [facility] at [facility] with [employer] Complaint. Brief description of triage: Pt with right ear pain that started last night. Pain today is an 8/10. Tender to the touch. Has some drainage, decreased hearing and is off balance. No fever. Triage indicates for patient to see today in office. Care advice provided, patient verbalizes understanding; denies any other questions or concerns; instructed to call back for any new or worsening symptoms. Writer provided warm transfer to [facility] for appointment scheduling. Attention Provider: Thank you for allowing me to participate in the care of your patient. The patient was connected to triage in response to information provided to the [facility]. Please do not respond through this encounter as the response is not directed to a shared pool. Reason for Disposition   Severe earache pain    Answer Assessment - Initial Assessment Questions  1. LOCATION: \"Which ear is involved? \"      Right ear    2. ONSET: \"When did the ear start hurting\"       Started last night    3. SEVERITY: \"How bad is the pain? \"  (Scale 1-10; mild, moderate or severe)    - MILD (1-3): doesn't interfere with normal activities     - MODERATE (4-7): interferes with normal activities or awakens from sleep     - SEVERE (8-10): excruciating pain, unable to do any normal activities      Pain today is 8/10    4. URI SYMPTOMS: \"Do you have a runny nose or cough? \"      No cough or URI symptoms    5. FEVER: \"Do you have a fever? \" If so, ask: \"What is your temperature, how was it measured, and when did it start? \"     No fever    6. CAUSE: \"Have you been swimming recently? \", \"How often do you use Q-TIPS? \", \"Have you had any recent air travel or scuba diving?\"       7. OTHER SYMPTOMS: \"Do you have any other symptoms? \" (e.g., headache, stiff neck, dizziness, vomiting, runny nose, decreased hearing)    Around the ear hurts to the touch,   Some drainage from ear  Hearing is decreased  No headache  A little dizziness, balance is a little off    8. PREGNANCY: \"Is there any chance you are pregnant? \" \"When was your last menstrual period? \"    na    Protocols used: EARACHE-ADULT-[place]

## 2021-08-02 ENCOUNTER — OFFICE VISIT (OUTPATIENT)
Dept: PODIATRY | Age: 51
End: 2021-08-02
Payer: COMMERCIAL

## 2021-08-02 VITALS — BODY MASS INDEX: 28.71 KG/M2 | HEIGHT: 72 IN | WEIGHT: 212 LBS

## 2021-08-02 DIAGNOSIS — E11.621 ULCER OF RIGHT GREAT TOE DUE TO DIABETES MELLITUS (HCC): Primary | ICD-10-CM

## 2021-08-02 DIAGNOSIS — M79.674 PAIN IN TOE OF RIGHT FOOT: ICD-10-CM

## 2021-08-02 DIAGNOSIS — E11.42 DIABETIC POLYNEUROPATHY ASSOCIATED WITH TYPE 2 DIABETES MELLITUS (HCC): ICD-10-CM

## 2021-08-02 DIAGNOSIS — L97.519 ULCER OF RIGHT GREAT TOE DUE TO DIABETES MELLITUS (HCC): Primary | ICD-10-CM

## 2021-08-02 PROCEDURE — 4004F PT TOBACCO SCREEN RCVD TLK: CPT | Performed by: PODIATRIST

## 2021-08-02 PROCEDURE — 97597 DBRDMT OPN WND 1ST 20 CM/<: CPT | Performed by: PODIATRIST

## 2021-08-02 PROCEDURE — 3046F HEMOGLOBIN A1C LEVEL >9.0%: CPT | Performed by: PODIATRIST

## 2021-08-02 PROCEDURE — G8417 CALC BMI ABV UP PARAM F/U: HCPCS | Performed by: PODIATRIST

## 2021-08-02 PROCEDURE — G8427 DOCREV CUR MEDS BY ELIG CLIN: HCPCS | Performed by: PODIATRIST

## 2021-08-02 PROCEDURE — 3017F COLORECTAL CA SCREEN DOC REV: CPT | Performed by: PODIATRIST

## 2021-08-02 PROCEDURE — 2022F DILAT RTA XM EVC RTNOPTHY: CPT | Performed by: PODIATRIST

## 2021-08-02 PROCEDURE — 99203 OFFICE O/P NEW LOW 30 MIN: CPT | Performed by: PODIATRIST

## 2021-08-02 ASSESSMENT — ENCOUNTER SYMPTOMS
COLOR CHANGE: 0
NAUSEA: 0
SHORTNESS OF BREATH: 0
BACK PAIN: 0
DIARRHEA: 0

## 2021-08-02 NOTE — PROGRESS NOTES
Alcides Poe is a 46 y.o. male who presents to the office today with chief complaint of blister to the right great toe. Chief Complaint   Patient presents with    Wound Check     patient had a blister that turned into a wound on the right hallux     Diabetes   Symptoms began about 2 month(s) ago. Patient denies injury to the right foot. Patient states that he was doing a lot of walking in flip flops and he developed a blister to the toe. Patient states that the blister opened up and developed a wound. Patient states that he got a lot of swelling to the right foot and ankle. Pain is rated 1 out of 10 at it's worst and is described as intermittent. Treatments prior to today's visit include: Patient was given oral antibiotics when it first occurred. Patinet has not bee dressing the wound. No Known Allergies    Past Medical History:   Diagnosis Date    Diabetic peripheral neuropathy ([facility] [address] 1/14/2014    Type II or unspecified type diabetes mellitus without mention of complication, not stated as uncontrolled        Prior to Admission medications    Medication Sig Start Date End Date Taking? Authorizing Provider   pioglitazone-metFORMIN (ACTOPLUS MET)  MG per tablet Take 1 tablet by mouth 2 times daily (with meals) 6/24/21  Yes Jacquelyn Land MD   glimepiride (AMARYL) 2 MG tablet Take 1 tablet by mouth Daily with supper 6/24/21  Yes MD ANNMARIE Hartman SOLOSTAR 300 UNIT/ML SOPN inject 65 units subcutaneously once daily 6/21/21  Yes Rachel Olszewski, PA-C   pregabalin (LYRICA) 50 MG capsule Take 1 capsule by mouth 3 times daily 5/25/16  Yes Jacquelyn Land MD   Insulin Syringe-Needle U-100 31G X 5/16\" 1 ML MISC 1 each by Does not apply route daily. 2/4/14  Yes Jacquelyn Land MD   Insulin Pen Needle 29G X 12.7MM MISC 1 each by Does not apply route daily.  1/29/14  Yes Jacquelyn Land MD   fexofenadine-pseudoephedrine (ALLEGRA-D 24HR) 180-240 MG per extended release tablet Take 1 tablet by mouth daily  Patient not taking: Reported on 8/2/2021 7/21/21   Keiry Adams PA-C   pregabalin (LYRICA) 50 MG capsule Take 1 capsule by mouth 3 times daily for 14 days. Patient not taking: Reported on 6/24/2021 3/20/14 6/24/21  MALIA Buenrostro - CNP       Past Surgical History:   Procedure Laterality Date    HAND REPLANTATION Left 6/7/89       Family History   Problem Relation Age of Onset    Diabetes Mother     Heart Disease Father     Cancer Father     Diabetes Maternal Grandmother        Social History     Tobacco Use    Smoking status: Never Smoker    Smokeless tobacco: Current User     Types: Chew    Tobacco comment: patient is not ready to quit   Substance Use Topics    Alcohol use: Yes     Alcohol/week: 0.0 standard drinks     Comment: ossacionally       Review of Systems   Constitutional: Negative for activity change, appetite change, chills, diaphoresis, fatigue and fever. Respiratory: Negative for shortness of breath. Cardiovascular: Negative for leg swelling. Gastrointestinal: Negative for diarrhea and nausea. Endocrine: Negative for cold intolerance, heat intolerance and polyuria. Musculoskeletal: Positive for arthralgias. Negative for back pain, gait problem, joint swelling and myalgias. Skin: Positive for wound. Negative for color change, pallor and rash. Allergic/Immunologic: Negative for environmental allergies and food allergies. Neurological: Negative for dizziness, weakness, light-headedness and numbness. Hematological: Does not bruise/bleed easily. Psychiatric/Behavioral: Negative for behavioral problems, confusion and self-injury. The patient is not nervous/anxious. Vitals: There were no vitals filed for this visit. General: AAO x 3 in NAD. Integument: There is a full thickness wound to the depth of the subcutaneous tissue of the right plantar hallux. There is hyperkeratotic tissue surrounding this wound.  The wound base is granular with overlying wound slough. There is no surrounding erythema or calor noted. Excisional debridement of the wound with a fifteen blade and a curette produces adequate bleeding. There is no purulence or malodor noted to the wound. There is no tunneling or undermining noted. The wound measures 1.3 cm x 1.2 cm x 0.2 cm. There is no induration, subcutaneous nodules, or tightening of the skin noted to the bilateral.     Toenails 1-5 of the right foot do not present with thickness, elongation, discoloration, brittleness, subungual debris. Toenails 1-5 of the left foot do not present with thickness, elongation, discoloration, brittleness, subungual debris. Interdigital maceration absent to web spaces 1-4, Bilateral.     There are no preulcerative lesions noted to the right foot. There are no preulcerative lesions noted to the left foot. The skin to the bilateral feet is not thin and shiny. The skin to the bilateral feet is  warm, supple, and dry. Vascular: DP pulse of the right foot is  palpable. DP pulse of the left foot is  palpable. PT pulse of the right foot is  palpable. PT pulse of the left foot is  palpable. CFT is less than 3 secs to the digits of the right foot. CFT is less than 3 secs to the digits of the left foot. There is edema noted to the right foot and ankle. There is no hair growth noted to the digits of the bilateral feet. There are no varicosities noted to the right foot/ankle. There are no varicosities noted to the left foot/ankle. Erythema is absent to the bilateral feet. Neurological: Reflexes are present to the right plantar foot and to the Achilles tendon. Reflexes are present to the left plantar foot and to the Achilles tendon. Protective sensation is absent to the right plantar foot as noted with a 5.07 Raymore-Meg monofilament.      Protective sensation is absent to the left plantar foot as noted with a 5.07 Silas-Meg monofilament. Musculoskeletal:  Muscle strength is +5/5 to all four muscle groups of the right lower extremity and +5/5 to all four muscle groups of the left lower extremity. There are no areas of subluxation, dislocation, or laxity noted to either lower extremity. Range of motion to the right ankle is  free of pain or grinding. Range of motion to the left ankle is  free of pain or grinding. Range of motion to the right subtalar joint is  free of pain or grinding. Range of motion to the left subtalar joint is  free of pain or grinding. No abnormalities, asymmetries, or misalignments are seen between the extremities. Weightbearing evaluation does reveal rearfoot eversion, medial prominence of the talar head, loss of the medial longitudinal arch height, and too many toes sign bilaterally. The lesser digits of the right foot are contracted. The lesser digits of the left foot are contracted. There is no prominence noted to the first metatarsal head without abduction of the hallux of the right foot. There is no prominence noted to the first metatarsal head without abduction of the hallux of the left foot. Shoe examination was performed. Biomechanical Exam: normal bilaterally. X-ray's taken: AP, Lateral, and Medial Oblique of the right foot. Findings: No bone involvement is present. There is no soft tissue gas noted. Asessment: Patient is a 46 y.o. male with:    Diagnosis Orders   1. Ulcer of right great toe due to diabetes mellitus (HCC)  XR FOOT RIGHT (MIN 3 VIEWS)    HI DEBRIDEMENT, SKIN, SUB-Q TISSUE,=<20 SQ CM   2. Pain in toe of right foot  XR FOOT RIGHT (MIN 3 VIEWS)    HI DEBRIDEMENT, SKIN, SUB-Q TISSUE,=<20 SQ CM   3. Diabetic polyneuropathy associated with type 2 diabetes mellitus (HCC)  XR FOOT RIGHT (MIN 3 VIEWS)    HI DEBRIDEMENT, SKIN, SUB-Q TISSUE,=<20 SQ CM       Plan:  1. Clinical evaluation of the patient.  2. Following excisional debridement of 100% of the wound of the right hallux to the depth of the subcutaneous tissue with a fifteen blade and a curette, the wound was dressed with antibiotic ointment and a DSD. Patient to change this dressing daily. Patient to limit his activity to ADL's only at this time. 3. Contact office with any questions/problems/concerns. Return in about 1 week (around 8/9/2021) for Wound Care.    8/2/2021      Elsa Goodwin DPM

## 2021-08-09 ENCOUNTER — OFFICE VISIT (OUTPATIENT)
Dept: PODIATRY | Age: 51
End: 2021-08-09
Payer: COMMERCIAL

## 2021-08-09 VITALS — HEIGHT: 72 IN | WEIGHT: 212 LBS | BODY MASS INDEX: 28.71 KG/M2

## 2021-08-09 DIAGNOSIS — E11.621 ULCER OF RIGHT GREAT TOE DUE TO DIABETES MELLITUS (HCC): Primary | ICD-10-CM

## 2021-08-09 DIAGNOSIS — L97.519 ULCER OF RIGHT GREAT TOE DUE TO DIABETES MELLITUS (HCC): Primary | ICD-10-CM

## 2021-08-09 DIAGNOSIS — E11.42 DIABETIC POLYNEUROPATHY ASSOCIATED WITH TYPE 2 DIABETES MELLITUS (HCC): ICD-10-CM

## 2021-08-09 DIAGNOSIS — M79.674 PAIN IN TOE OF RIGHT FOOT: ICD-10-CM

## 2021-08-09 PROCEDURE — 11042 DBRDMT SUBQ TIS 1ST 20SQCM/<: CPT | Performed by: PODIATRIST

## 2021-08-09 ASSESSMENT — ENCOUNTER SYMPTOMS
NAUSEA: 0
COLOR CHANGE: 0
BACK PAIN: 0
SHORTNESS OF BREATH: 0
DIARRHEA: 0

## 2021-08-09 NOTE — PROGRESS NOTES
Follow-Up Wound Progress Note   Gila Huffman  AGE: 46 y.o. GENDER: male  : 1970  TODAY'S DATE:  2021  Subjective:    Gila Huffman is a 46 y.o. male who presents today for wound check. Wound Location : Right great toe. The patients pain is 5 out of 10. Patient states that they have been changing the dressing to the right lower extremity(s) with antibiotic ointment and a band aid daily as instructed since last visit. Modifying factors: Pressure, diabetes. Review of Systems   Constitutional: Negative for activity change, appetite change, chills, diaphoresis, fatigue and fever. Respiratory: Negative for shortness of breath. Cardiovascular: Negative for leg swelling. Gastrointestinal: Negative for diarrhea and nausea. Endocrine: Negative for cold intolerance, heat intolerance and polyuria. Musculoskeletal: Positive for arthralgias. Negative for back pain, gait problem, joint swelling and myalgias. Skin: Positive for wound. Negative for color change, pallor and rash. Allergic/Immunologic: Negative for environmental allergies and food allergies. Neurological: Negative for dizziness, weakness, light-headedness and numbness. Hematological: Does not bruise/bleed easily. Psychiatric/Behavioral: Negative for behavioral problems, confusion and self-injury. The patient is not nervous/anxious. Objective:   Exam:  There is a full thickness wound to the depth of the subcutaneous tissue of the right plantar hallux. There is hyperkeratotic tissue surrounding this wound, but this has decreased since last visit. The wound base is granular with overlying wound slough. There is no surrounding erythema or calor noted. Excisional debridement of the wound with a curette produces adequate bleeding. There is no purulence or malodor noted to the wound. There is no tunneling or undermining noted. The wound measures 1.3 cm x 1.2 cm x 0.2 cm. Procedure:   The wound(s) to the right hallux was debrided with removal of fibrotic, necrotic, and hyperkeratotic tissue, including a layer of surrounding healthy tissue down through and including subcutaneous tissue,using curette. Excisional Debridement  Percent of Wound Debrided: 100%    Wound: is unchanged    Bleeding: Minimal    Hemostasis:   by pressure    Response to treatment:  Well tolerated by patient. Assessment:      Diagnosis Orders   1. Ulcer of right great toe due to diabetes mellitus (HCC)  FL DEBRIDEMENT, SKIN, SUB-Q TISSUE,=<20 SQ CM   2. Pain in toe of right foot  FL DEBRIDEMENT, SKIN, SUB-Q TISSUE,=<20 SQ CM   3. Diabetic polyneuropathy associated with type 2 diabetes mellitus (HCC)  FL DEBRIDEMENT, SKIN, SUB-Q TISSUE,=<20 SQ CM           Plan:   Plan for wound -   Treatment:                Dressed with: Antibiotic ointment and a band aid. Home care: Patient to change this dressing daily. Abx :No Cultures :were notobtained. Return in about 1 week (around 8/16/2021) for Wound Care.    8/9/2021        Fern Barboza DPM

## 2021-08-16 ENCOUNTER — OFFICE VISIT (OUTPATIENT)
Dept: PODIATRY | Age: 51
End: 2021-08-16
Payer: COMMERCIAL

## 2021-08-16 VITALS — BODY MASS INDEX: 28.71 KG/M2 | WEIGHT: 212 LBS | HEIGHT: 72 IN

## 2021-08-16 DIAGNOSIS — E11.621 ULCER OF RIGHT GREAT TOE DUE TO DIABETES MELLITUS (HCC): Primary | ICD-10-CM

## 2021-08-16 DIAGNOSIS — M79.674 PAIN IN TOE OF RIGHT FOOT: ICD-10-CM

## 2021-08-16 DIAGNOSIS — E11.42 DIABETIC POLYNEUROPATHY ASSOCIATED WITH TYPE 2 DIABETES MELLITUS (HCC): ICD-10-CM

## 2021-08-16 DIAGNOSIS — L97.519 ULCER OF RIGHT GREAT TOE DUE TO DIABETES MELLITUS (HCC): Primary | ICD-10-CM

## 2021-08-16 PROCEDURE — 11042 DBRDMT SUBQ TIS 1ST 20SQCM/<: CPT | Performed by: PODIATRIST

## 2021-08-17 ASSESSMENT — ENCOUNTER SYMPTOMS
BACK PAIN: 0
NAUSEA: 0
SHORTNESS OF BREATH: 0
DIARRHEA: 0
COLOR CHANGE: 0

## 2021-08-17 NOTE — PROGRESS NOTES
Follow-Up Wound Progress Note   Karley Christian  AGE: 46 y.o. GENDER: male  : 1970  TODAY'S DATE:  2021  Subjective:    Karley Christian is a 46 y.o. male who presents today for wound check. Wound Location : Right great toe. The patients pain is 5 out of 10. Patient states that they have been changing the dressing to the right lower extremity(s) with antibiotic ointment and a band aid daily as instructed since last visit. Modifying factors: Pressure, diabetes. Review of Systems   Constitutional: Negative for activity change, appetite change, chills, diaphoresis, fatigue and fever. Respiratory: Negative for shortness of breath. Cardiovascular: Negative for leg swelling. Gastrointestinal: Negative for diarrhea and nausea. Endocrine: Negative for cold intolerance, heat intolerance and polyuria. Musculoskeletal: Positive for arthralgias. Negative for back pain, gait problem, joint swelling and myalgias. Skin: Positive for wound. Negative for color change, pallor and rash. Allergic/Immunologic: Negative for environmental allergies and food allergies. Neurological: Negative for dizziness, weakness, light-headedness and numbness. Hematological: Does not bruise/bleed easily. Psychiatric/Behavioral: Negative for behavioral problems, confusion and self-injury. The patient is not nervous/anxious. Objective:   Exam:  There is a full thickness wound to the depth of the subcutaneous tissue of the right plantar hallux. There is mild hyperkeratotic tissue surrounding this wound, but this has decreased since last visit. The wound base is granular with overlying wound slough. There is no surrounding erythema or calor noted. Excisional debridement of the wound with a curette produces adequate bleeding. There is no purulence or malodor noted to the wound. There is no tunneling or undermining noted. The wound measures 1.1 cm x 1.1 cm x 0.2 cm. Procedure:   The wound(s) to the right hallux was debrided with removal of fibrotic, necrotic, and hyperkeratotic tissue, including a layer of surrounding healthy tissue down through and including subcutaneous tissue,using curette. Excisional Debridement  Percent of Wound Debrided: 100%    Wound: has improved    Bleeding: Minimal    Hemostasis:   by pressure    Response to treatment:  Well tolerated by patient. Assessment:      Diagnosis Orders   1. Ulcer of right great toe due to diabetes mellitus (HCC)  VA DEBRIDEMENT, SKIN, SUB-Q TISSUE,=<20 SQ CM   2. Pain in toe of right foot  VA DEBRIDEMENT, SKIN, SUB-Q TISSUE,=<20 SQ CM   3. Diabetic polyneuropathy associated with type 2 diabetes mellitus (HCC)  VA DEBRIDEMENT, SKIN, SUB-Q TISSUE,=<20 SQ CM           Plan:   Plan for wound -   Treatment:                Dressed with: Antibiotic ointment and a DSD. Home care: Patient to change the dressing to the right foot daily with antibiotic ointment and a DSD. Abx :No Cultures :were notobtained. Return in about 1 week (around 8/23/2021) for Wound Care.    8/16/2021        Terri Hidalgo DPM

## 2021-08-23 ENCOUNTER — OFFICE VISIT (OUTPATIENT)
Dept: PODIATRY | Age: 51
End: 2021-08-23
Payer: COMMERCIAL

## 2021-08-23 VITALS — WEIGHT: 212 LBS | HEIGHT: 72 IN | BODY MASS INDEX: 28.71 KG/M2

## 2021-08-23 DIAGNOSIS — E11.621 ULCER OF RIGHT GREAT TOE DUE TO DIABETES MELLITUS (HCC): Primary | ICD-10-CM

## 2021-08-23 DIAGNOSIS — L97.519 ULCER OF RIGHT GREAT TOE DUE TO DIABETES MELLITUS (HCC): Primary | ICD-10-CM

## 2021-08-23 DIAGNOSIS — M79.674 PAIN IN TOE OF RIGHT FOOT: ICD-10-CM

## 2021-08-23 DIAGNOSIS — E11.42 DIABETIC POLYNEUROPATHY ASSOCIATED WITH TYPE 2 DIABETES MELLITUS (HCC): ICD-10-CM

## 2021-08-23 PROCEDURE — 11042 DBRDMT SUBQ TIS 1ST 20SQCM/<: CPT | Performed by: PODIATRIST

## 2021-08-23 ASSESSMENT — ENCOUNTER SYMPTOMS
SHORTNESS OF BREATH: 0
COLOR CHANGE: 0
NAUSEA: 0
DIARRHEA: 0
BACK PAIN: 0

## 2021-08-23 NOTE — PROGRESS NOTES
Follow-Up Wound Progress Note   Geno Pérez  AGE: 46 y.o. GENDER: male  : 1970  TODAY'S DATE:  2021  Subjective:    Geno Pérez is a 46 y.o. male who presents today for wound check. Wound Location : Right great toe. The patients pain is 5 out of 10. Patient states that they have been changing the dressing to the right lower extremity(s) with antibiotic ointment and a DSD daily as instructed since last visit. Modifying factors: Pressure, diabetes. Review of Systems   Constitutional: Negative for activity change, appetite change, chills, diaphoresis, fatigue and fever. Respiratory: Negative for shortness of breath. Cardiovascular: Negative for leg swelling. Gastrointestinal: Negative for diarrhea and nausea. Endocrine: Negative for cold intolerance, heat intolerance and polyuria. Musculoskeletal: Positive for arthralgias. Negative for back pain, gait problem, joint swelling and myalgias. Skin: Positive for wound. Negative for color change, pallor and rash. Allergic/Immunologic: Negative for environmental allergies and food allergies. Neurological: Negative for dizziness, weakness, light-headedness and numbness. Hematological: Does not bruise/bleed easily. Psychiatric/Behavioral: Negative for behavioral problems, confusion and self-injury. The patient is not nervous/anxious. Objective:   Exam:  There is a full thickness wound to the depth of the subcutaneous tissue of the right plantar hallux. There is mild hyperkeratotic tissue surrounding this wound, but this has decreased since last visit. The wound base is granular with overlying wound slough. There is no surrounding erythema or calor noted. Excisional debridement of the wound with a curette produces adequate bleeding. There is no purulence or malodor noted to the wound. There is no tunneling or undermining noted. The wound measures 1.0 cm x 0.8 cm x 0.2 cm. Procedure:   The wound(s) to the right hallux was debrided with removal of fibrotic, necrotic, and hyperkeratotic tissue, including a layer of surrounding healthy tissue down through and including subcutaneous tissue,using curette. Excisional Debridement  Percent of Wound Debrided: 100%    Wound: has improved    Bleeding: Minimal    Hemostasis:   by pressure    Response to treatment:  Well tolerated by patient. Assessment:      Diagnosis Orders   1. Ulcer of right great toe due to diabetes mellitus (HCC)  ID DEBRIDEMENT, SKIN, SUB-Q TISSUE,=<20 SQ CM   2. Pain in toe of right foot  ID DEBRIDEMENT, SKIN, SUB-Q TISSUE,=<20 SQ CM   3. Diabetic polyneuropathy associated with type 2 diabetes mellitus (HCC)  ID DEBRIDEMENT, SKIN, SUB-Q TISSUE,=<20 SQ CM           Plan:   Plan for wound -   Treatment:                Dressed with: Rosette and a band aid. Home care: Patient given a prescription for Rosette with instructions to change the dressing daily. Abx :No Cultures :were notobtained. Return in about 1 week (around 8/30/2021) for Wound Care.    8/23/2021        Nando Deshpande DPM

## 2021-08-30 ENCOUNTER — OFFICE VISIT (OUTPATIENT)
Dept: PODIATRY | Age: 51
End: 2021-08-30
Payer: COMMERCIAL

## 2021-08-30 VITALS — BODY MASS INDEX: 28.44 KG/M2 | WEIGHT: 210 LBS | HEIGHT: 72 IN

## 2021-08-30 DIAGNOSIS — L97.519 ULCER OF RIGHT GREAT TOE DUE TO DIABETES MELLITUS (HCC): Primary | ICD-10-CM

## 2021-08-30 DIAGNOSIS — E11.621 ULCER OF RIGHT GREAT TOE DUE TO DIABETES MELLITUS (HCC): Primary | ICD-10-CM

## 2021-08-30 DIAGNOSIS — M79.674 PAIN IN TOE OF RIGHT FOOT: ICD-10-CM

## 2021-08-30 DIAGNOSIS — E11.42 DIABETIC POLYNEUROPATHY ASSOCIATED WITH TYPE 2 DIABETES MELLITUS (HCC): ICD-10-CM

## 2021-08-30 PROCEDURE — 11042 DBRDMT SUBQ TIS 1ST 20SQCM/<: CPT | Performed by: PODIATRIST

## 2021-08-30 ASSESSMENT — ENCOUNTER SYMPTOMS
BACK PAIN: 0
COLOR CHANGE: 0
DIARRHEA: 0
SHORTNESS OF BREATH: 0
NAUSEA: 0

## 2021-08-30 NOTE — PROGRESS NOTES
Follow-Up Wound Progress Note   Terell Hernandez  AGE: 46 y.o. GENDER: male  : 1970  TODAY'S DATE:  2021  Subjective:    Terell Hernandez is a 46 y.o. male who presents today for wound check. Wound Location : Right great toe. The patients pain is 3 out of 10. Patient states that they have been changing the dressing to the right lower extremity(s) with Rosette and a band aid daily as instructed since last visit. Modifying factors: Pressure, diabetes. Review of Systems   Constitutional: Negative for activity change, appetite change, chills, diaphoresis, fatigue and fever. Respiratory: Negative for shortness of breath. Cardiovascular: Negative for leg swelling. Gastrointestinal: Negative for diarrhea and nausea. Endocrine: Negative for cold intolerance, heat intolerance and polyuria. Musculoskeletal: Positive for arthralgias. Negative for back pain, gait problem, joint swelling and myalgias. Skin: Positive for wound. Negative for color change, pallor and rash. Allergic/Immunologic: Negative for environmental allergies and food allergies. Neurological: Negative for dizziness, weakness, light-headedness and numbness. Hematological: Does not bruise/bleed easily. Psychiatric/Behavioral: Negative for behavioral problems, confusion and self-injury. The patient is not nervous/anxious. Objective:   Exam:  There is a full thickness wound to the depth of the subcutaneous tissue of the right plantar hallux. There is mild hyperkeratotic tissue surrounding this wound, but this has decreased since last visit. The wound base is granular with overlying wound slough. There is no surrounding erythema or calor noted. Excisional debridement of the wound with a curette produces adequate bleeding. There is no purulence or malodor noted to the wound. There is no tunneling or undermining noted.  The wound has decreased in size since last visit and now measures 0.9 cm x 0.6 cm x 0.2 cm.    Procedure: The wound(s) to the right hallux was debrided with removal of fibrotic, necrotic, and hyperkeratotic tissue, including a layer of surrounding healthy tissue down through and including subcutaneous tissue,using curette and #15 blade scalpel. Excisional Debridement  Percent of Wound Debrided: 100%    Wound: has improved    Bleeding: Minimal    Hemostasis:   by pressure    Response to treatment:  Well tolerated by patient. Assessment:      Diagnosis Orders   1. Ulcer of right great toe due to diabetes mellitus (HCC)  MS DEBRIDEMENT, SKIN, SUB-Q TISSUE,=<20 SQ CM   2. Pain in toe of right foot  MS DEBRIDEMENT, SKIN, SUB-Q TISSUE,=<20 SQ CM   3. Diabetic polyneuropathy associated with type 2 diabetes mellitus (HCC)  MS DEBRIDEMENT, SKIN, SUB-Q TISSUE,=<20 SQ CM           Plan:   Plan for wound -   Treatment:                Dressed with: Rosette and a band aid. Home care: Patient to change the dressing to the right hallux daily with Rosette and a band aid. Abx :No Cultures :were notobtained. Return in about 1 week (around 9/6/2021) for Wound Care.    8/30/2021        Janie Morales DPM

## 2021-09-07 ENCOUNTER — OFFICE VISIT (OUTPATIENT)
Dept: PODIATRY | Age: 51
End: 2021-09-07
Payer: COMMERCIAL

## 2021-09-07 VITALS — WEIGHT: 210 LBS | BODY MASS INDEX: 28.44 KG/M2 | HEIGHT: 72 IN

## 2021-09-07 DIAGNOSIS — L97.519 ULCER OF RIGHT GREAT TOE DUE TO DIABETES MELLITUS (HCC): Primary | ICD-10-CM

## 2021-09-07 DIAGNOSIS — E11.621 ULCER OF RIGHT GREAT TOE DUE TO DIABETES MELLITUS (HCC): Primary | ICD-10-CM

## 2021-09-07 DIAGNOSIS — E11.42 DIABETIC POLYNEUROPATHY ASSOCIATED WITH TYPE 2 DIABETES MELLITUS (HCC): ICD-10-CM

## 2021-09-07 DIAGNOSIS — M79.674 PAIN IN TOE OF RIGHT FOOT: ICD-10-CM

## 2021-09-07 PROCEDURE — 11042 DBRDMT SUBQ TIS 1ST 20SQCM/<: CPT | Performed by: PODIATRIST

## 2021-09-13 ASSESSMENT — ENCOUNTER SYMPTOMS
COLOR CHANGE: 0
DIARRHEA: 0
NAUSEA: 0
SHORTNESS OF BREATH: 0
BACK PAIN: 0

## 2021-09-13 NOTE — PROGRESS NOTES
Follow-Up Wound Progress Note   Ion Mcknight  AGE: 46 y.o. GENDER: male  : 1970  TODAY'S DATE:  2021  Subjective:    Ion Mcknight is a 46 y.o. male who presents today for wound check. Wound Location : Right great toe. The patients pain is 2 out of 10. Patient states that they have been changing the dressing to the right lower extremity(s) with Roestte and a band aid daily as instructed since last visit. Modifying factors: Pressure, diabetes. Review of Systems   Constitutional: Negative for activity change, appetite change, chills, diaphoresis, fatigue and fever. Respiratory: Negative for shortness of breath. Cardiovascular: Negative for leg swelling. Gastrointestinal: Negative for diarrhea and nausea. Endocrine: Negative for cold intolerance, heat intolerance and polyuria. Musculoskeletal: Positive for arthralgias. Negative for back pain, gait problem, joint swelling and myalgias. Skin: Positive for wound. Negative for color change, pallor and rash. Allergic/Immunologic: Negative for environmental allergies and food allergies. Neurological: Negative for dizziness, weakness, light-headedness and numbness. Hematological: Does not bruise/bleed easily. Psychiatric/Behavioral: Negative for behavioral problems, confusion and self-injury. The patient is not nervous/anxious. Objective:   Exam:  There is a full thickness wound to the depth of the subcutaneous tissue of the right plantar hallux. There is mild hyperkeratotic tissue surrounding this wound, but this has again decreased since last visit. The wound base is granular with overlying wound slough. There is no surrounding erythema or calor noted. Excisional debridement of the wound with a curette produces adequate bleeding. There is no purulence or malodor noted to the wound. There is no tunneling or undermining noted.  The wound has decreased in size since last visit and now measures 0.8 cm x 0.4 cm x 0.2 cm.    Procedure: The wound(s) to the right hallux was debrided with removal of fibrotic, necrotic, and hyperkeratotic tissue, including a layer of surrounding healthy tissue down through and including subcutaneous tissue,using curette. Excisional Debridement  Percent of Wound Debrided: 100%    Wound: has improved    Bleeding: Minimal    Hemostasis:   by pressure    Response to treatment:  Well tolerated by patient. Assessment:      Diagnosis Orders   1. Ulcer of right great toe due to diabetes mellitus (HCC)  AK DEBRIDEMENT, SKIN, SUB-Q TISSUE,=<20 SQ CM   2. Pain in toe of right foot  AK DEBRIDEMENT, SKIN, SUB-Q TISSUE,=<20 SQ CM   3. Diabetic polyneuropathy associated with type 2 diabetes mellitus (HCC)  AK DEBRIDEMENT, SKIN, SUB-Q TISSUE,=<20 SQ CM           Plan:   Plan for wound -   Treatment:                Dressed with: Rosette and a DSD. Home care: Patient to change the dressing daily with Rosette and a band aid. Abx :No Cultures :were notobtained. Return in about 1 week (around 9/14/2021) for Wound Care.    9/7/2021        Tod Rivera DPM

## 2021-09-14 ENCOUNTER — OFFICE VISIT (OUTPATIENT)
Dept: PODIATRY | Age: 51
End: 2021-09-14
Payer: COMMERCIAL

## 2021-09-14 VITALS — BODY MASS INDEX: 28.44 KG/M2 | WEIGHT: 210 LBS | HEIGHT: 72 IN

## 2021-09-14 DIAGNOSIS — E11.42 DIABETIC POLYNEUROPATHY ASSOCIATED WITH TYPE 2 DIABETES MELLITUS (HCC): ICD-10-CM

## 2021-09-14 DIAGNOSIS — E11.621 ULCER OF RIGHT GREAT TOE DUE TO DIABETES MELLITUS (HCC): Primary | ICD-10-CM

## 2021-09-14 DIAGNOSIS — L97.519 ULCER OF RIGHT GREAT TOE DUE TO DIABETES MELLITUS (HCC): Primary | ICD-10-CM

## 2021-09-14 DIAGNOSIS — M79.674 PAIN IN TOE OF RIGHT FOOT: ICD-10-CM

## 2021-09-14 PROCEDURE — 11042 DBRDMT SUBQ TIS 1ST 20SQCM/<: CPT | Performed by: PODIATRIST

## 2021-09-14 ASSESSMENT — ENCOUNTER SYMPTOMS
SHORTNESS OF BREATH: 0
DIARRHEA: 0
NAUSEA: 0
COLOR CHANGE: 0
BACK PAIN: 0

## 2021-09-14 NOTE — PROGRESS NOTES
Follow-Up Wound Progress Note   Erick Kramer  AGE: 46 y.o. GENDER: male  : 1970  TODAY'S DATE:  2021  Subjective:    Erick Kramer is a 46 y.o. male who presents today for wound check. Wound Location : Right great toe. The patients pain is 2 out of 10. Patient states that they have been changing the dressing to the right lower extremity(s) with Rosette and a band aid daily as instructed since last visit. Modifying factors: Pressure, diabetes. Review of Systems   Constitutional: Negative for activity change, appetite change, chills, diaphoresis, fatigue and fever. Respiratory: Negative for shortness of breath. Cardiovascular: Negative for leg swelling. Gastrointestinal: Negative for diarrhea and nausea. Endocrine: Negative for cold intolerance, heat intolerance and polyuria. Musculoskeletal: Positive for arthralgias. Negative for back pain, gait problem, joint swelling and myalgias. Skin: Positive for wound. Negative for color change, pallor and rash. Allergic/Immunologic: Negative for environmental allergies and food allergies. Neurological: Negative for dizziness, weakness, light-headedness and numbness. Hematological: Does not bruise/bleed easily. Psychiatric/Behavioral: Negative for behavioral problems, confusion and self-injury. The patient is not nervous/anxious. Objective:   Exam:  There is a full thickness wound to the depth of the subcutaneous tissue of the right plantar hallux. There is mild hyperkeratotic tissue surrounding this wound, but this has again decreased since last visit. The wound base is granular with overlying wound slough. There is no surrounding erythema or calor noted. Excisional debridement of the wound with a fifteen blade produces adequate bleeding. There is no purulence or malodor noted to the wound. There is no tunneling or undermining noted.  The wound has decreased in size since last visit and now measures 0.8 cm x 0.2 cm x 0.2 cm.    Procedure: The wound(s) to the right hallux was debrided with removal of fibrotic, necrotic, and hyperkeratotic tissue, including a layer of surrounding healthy tissue down through and including subcutaneous tissue,using #15 blade scalpel. Excisional Debridement  Percent of Wound Debrided: 100%    Wound: has improved    Bleeding: Minimal    Hemostasis:   by pressure    Response to treatment:  Well tolerated by patient. Assessment:      Diagnosis Orders   1. Ulcer of right great toe due to diabetes mellitus (HCC)  IL DEBRIDEMENT, SKIN, SUB-Q TISSUE,=<20 SQ CM   2. Pain in toe of right foot  IL DEBRIDEMENT, SKIN, SUB-Q TISSUE,=<20 SQ CM   3. Diabetic polyneuropathy associated with type 2 diabetes mellitus (HCC)  IL DEBRIDEMENT, SKIN, SUB-Q TISSUE,=<20 SQ CM           Plan:   Plan for wound -   Treatment:                Dressed with: Rosette and a band aid. Home care: Patient to change the dressing daily with Rosette and a band aid. Abx :No Cultures :were notobtained. Return in about 1 week (around 9/21/2021) for Wound Care.    9/14/2021        Elyssa Landry DPM

## 2021-09-21 ENCOUNTER — OFFICE VISIT (OUTPATIENT)
Dept: PODIATRY | Age: 51
End: 2021-09-21
Payer: COMMERCIAL

## 2021-09-21 VITALS — WEIGHT: 210 LBS | BODY MASS INDEX: 28.44 KG/M2 | HEIGHT: 72 IN

## 2021-09-21 DIAGNOSIS — L97.519 ULCER OF RIGHT GREAT TOE DUE TO DIABETES MELLITUS (HCC): Primary | ICD-10-CM

## 2021-09-21 DIAGNOSIS — M79.674 PAIN IN TOE OF RIGHT FOOT: ICD-10-CM

## 2021-09-21 DIAGNOSIS — E11.621 ULCER OF RIGHT GREAT TOE DUE TO DIABETES MELLITUS (HCC): Primary | ICD-10-CM

## 2021-09-21 DIAGNOSIS — L08.9 INFECTION OF RIGHT FOOT: ICD-10-CM

## 2021-09-21 DIAGNOSIS — L03.115 CELLULITIS OF RIGHT FOOT: ICD-10-CM

## 2021-09-21 PROCEDURE — 11042 DBRDMT SUBQ TIS 1ST 20SQCM/<: CPT | Performed by: PODIATRIST

## 2021-09-21 RX ORDER — CIPROFLOXACIN 500 MG/1
500 TABLET, FILM COATED ORAL 2 TIMES DAILY
Qty: 20 TABLET | Refills: 0 | Status: SHIPPED | OUTPATIENT
Start: 2021-09-21 | End: 2021-10-01

## 2021-09-21 RX ORDER — CLINDAMYCIN HYDROCHLORIDE 300 MG/1
300 CAPSULE ORAL 4 TIMES DAILY
Qty: 40 CAPSULE | Refills: 0 | Status: SHIPPED | OUTPATIENT
Start: 2021-09-21 | End: 2021-10-01

## 2021-09-21 ASSESSMENT — ENCOUNTER SYMPTOMS
NAUSEA: 0
DIARRHEA: 0
SHORTNESS OF BREATH: 0
BACK PAIN: 0
COLOR CHANGE: 0

## 2021-09-21 NOTE — PROGRESS NOTES
Follow-Up Wound Progress Note   Deyanira Viera  AGE: 46 y.o. GENDER: male  : 1970  TODAY'S DATE:  2021  Subjective:    Deyanira Viera is a 46 y.o. male who presents today for wound check. Wound Location : Right great toe. The patients pain is 2 out of 10. Patient states that they have been changing the dressing to the right lower extremity(s) with Rosette and a band aid daily as instructed since last visit. Modifying factors: Pressure, diabetes. Patient also complains today that his right foot and leg is swollen. Patient states that his blood sugar has been running high. Review of Systems   Constitutional: Negative for activity change, appetite change, chills, diaphoresis, fatigue and fever. Respiratory: Negative for shortness of breath. Cardiovascular: Negative for leg swelling. Gastrointestinal: Negative for diarrhea and nausea. Endocrine: Negative for cold intolerance, heat intolerance and polyuria. Musculoskeletal: Positive for arthralgias. Negative for back pain, gait problem, joint swelling and myalgias. Skin: Positive for wound. Negative for color change, pallor and rash. Allergic/Immunologic: Negative for environmental allergies and food allergies. Neurological: Negative for dizziness, weakness, light-headedness and numbness. Hematological: Does not bruise/bleed easily. Psychiatric/Behavioral: Negative for behavioral problems, confusion and self-injury. The patient is not nervous/anxious. Objective:   Exam:  There is a full thickness wound to the depth of the subcutaneous tissue of the right plantar hallux. There is mild hyperkeratotic tissue surrounding this wound, but this has again decreased since last visit. The wound base is granular with overlying wound slough. There is no surrounding erythema or calor noted. Excisional debridement of the wound with a fifteen blade produces adequate bleeding. There is no purulence or malodor noted to the wound.  There is no tunneling or undermining noted. The wound has decreased in size since last visit and now measures 0.6 cm x 0.2 cm x 0.2 cm. There is significant edema and mild calor to the right lower extremity. There is no pain with palpation to the right posterior calf. No open wounds are noted other than the hallux. Procedure: The wound(s) to the right hallux was debrided with removal of fibrotic, necrotic, and hyperkeratotic tissue, including a layer of surrounding healthy tissue down through and including subcutaneous tissue,using curette. Excisional Debridement  Percent of Wound Debrided: 100%    Wound: has improved    Bleeding: Minimal    Hemostasis:   by pressure    Response to treatment:  Well tolerated by patient. Assessment:      Diagnosis Orders   1. Ulcer of right great toe due to diabetes mellitus (HCC)  ciprofloxacin (CIPRO) 500 MG tablet    clindamycin (CLEOCIN) 300 MG capsule    NH DEBRIDEMENT, SKIN, SUB-Q TISSUE,=<20 SQ CM   2. Infection of right foot  ciprofloxacin (CIPRO) 500 MG tablet    clindamycin (CLEOCIN) 300 MG capsule    NH DEBRIDEMENT, SKIN, SUB-Q TISSUE,=<20 SQ CM   3. Cellulitis of right foot  ciprofloxacin (CIPRO) 500 MG tablet    clindamycin (CLEOCIN) 300 MG capsule    NH DEBRIDEMENT, SKIN, SUB-Q TISSUE,=<20 SQ CM   4. Pain in toe of right foot  ciprofloxacin (CIPRO) 500 MG tablet    clindamycin (CLEOCIN) 300 MG capsule    NH DEBRIDEMENT, SKIN, SUB-Q TISSUE,=<20 SQ CM           Plan:   Plan for wound -   Treatment:                Dressed with: Rosette and a band aid. Home care: Patient to change this dressing daily. Abx :Yes; clindamycin and ciprofloxacin for 10 days. Cultures :were notobtained.   Return in about 1 week (around 9/28/2021) for Wound Care.   9/21/2021        Andrade Casillas DPM

## 2021-09-28 ENCOUNTER — OFFICE VISIT (OUTPATIENT)
Dept: PODIATRY | Age: 51
End: 2021-09-28
Payer: COMMERCIAL

## 2021-09-28 VITALS — HEIGHT: 72 IN | BODY MASS INDEX: 28.44 KG/M2 | WEIGHT: 210 LBS

## 2021-09-28 DIAGNOSIS — L97.519 ULCER OF RIGHT GREAT TOE DUE TO DIABETES MELLITUS (HCC): Primary | ICD-10-CM

## 2021-09-28 DIAGNOSIS — E11.42 DIABETIC POLYNEUROPATHY ASSOCIATED WITH TYPE 2 DIABETES MELLITUS (HCC): ICD-10-CM

## 2021-09-28 DIAGNOSIS — E11.621 ULCER OF RIGHT GREAT TOE DUE TO DIABETES MELLITUS (HCC): Primary | ICD-10-CM

## 2021-09-28 DIAGNOSIS — M79.674 PAIN IN TOE OF RIGHT FOOT: ICD-10-CM

## 2021-09-28 PROCEDURE — 11042 DBRDMT SUBQ TIS 1ST 20SQCM/<: CPT | Performed by: PODIATRIST

## 2021-09-28 PROCEDURE — 99999 PR OFFICE/OUTPT VISIT,PROCEDURE ONLY: CPT | Performed by: PODIATRIST

## 2021-09-28 ASSESSMENT — ENCOUNTER SYMPTOMS
NAUSEA: 0
SHORTNESS OF BREATH: 0
COLOR CHANGE: 0
BACK PAIN: 0
DIARRHEA: 0

## 2021-09-28 NOTE — PROGRESS NOTES
Follow-Up Wound Progress Note   Alexander Mota  AGE: 46 y.o. GENDER: male  : 1970  TODAY'S DATE:  2021  Subjective:    Alexander Mota is a 46 y.o. male who presents today for wound check. Wound Location : Right great toe. The patients pain is 2 out of 10. Patient states that they have been changing the dressing to the right lower extremity(s) with Rosette and a band aid as instructed since last visit. Modifying factors: Pressure, diabetes. Review of Systems   Constitutional: Negative for activity change, appetite change, chills, diaphoresis, fatigue and fever. Respiratory: Negative for shortness of breath. Cardiovascular: Negative for leg swelling. Gastrointestinal: Negative for diarrhea and nausea. Endocrine: Negative for cold intolerance, heat intolerance and polyuria. Musculoskeletal: Positive for arthralgias. Negative for back pain, gait problem, joint swelling and myalgias. Skin: Positive for wound. Negative for color change, pallor and rash. Allergic/Immunologic: Negative for environmental allergies and food allergies. Neurological: Negative for dizziness, weakness, light-headedness and numbness. Hematological: Does not bruise/bleed easily. Psychiatric/Behavioral: Negative for behavioral problems, confusion and self-injury. The patient is not nervous/anxious. Objective:   Exam:  There is a full thickness wound to the depth of the subcutaneous tissue of the right plantar hallux. There is mild hyperkeratotic tissue surrounding this wound, but this has again decreased since last visit. The wound base is granular with overlying wound slough. There is no surrounding erythema or calor noted. Excisional debridement of the wound with a fifteen blade and a curette produces adequate bleeding. There is no purulence or malodor noted to the wound. There is no tunneling or undermining noted.  The wound has decreased in size since last visit and now measures 0.4 cm x 0.2 cm x 0. 2 cm. The excessive edema to the RLE has resolved since last visit. Procedure: The wound(s) to the right hallux was debrided with removal of fibrotic, necrotic, and hyperkeratotic tissue, including a layer of surrounding healthy tissue down through and including subcutaneous tissue,using curette and #15 blade scalpel. Excisional Debridement  Percent of Wound Debrided: 100%    Wound: has improved    Bleeding: Minimal    Hemostasis:   by pressure    Response to treatment:  Well tolerated by patient. Assessment:      Diagnosis Orders   1. Ulcer of right great toe due to diabetes mellitus (HCC)  OR DEBRIDEMENT, SKIN, SUB-Q TISSUE,=<20 SQ CM   2. Pain in toe of right foot  OR DEBRIDEMENT, SKIN, SUB-Q TISSUE,=<20 SQ CM   3. Diabetic polyneuropathy associated with type 2 diabetes mellitus (HCC)  OR DEBRIDEMENT, SKIN, SUB-Q TISSUE,=<20 SQ CM           Plan:   Plan for wound -   Treatment:                Dressed with: Rosette and a band aid. Home care: Patient to change the dressing to the right hallux daily with Rosette and a band aid. Abx :Yes; Patient is still taking his oral clindamycin and ciprofloxacin. Cultures :were notobtained. Return in about 1 week (around 10/5/2021) for Wound Care.    9/28/2021        Toño Plasencia DPM

## 2021-10-05 ENCOUNTER — OFFICE VISIT (OUTPATIENT)
Dept: PODIATRY | Age: 51
End: 2021-10-05
Payer: COMMERCIAL

## 2021-10-05 VITALS — HEIGHT: 72 IN | BODY MASS INDEX: 28.44 KG/M2 | WEIGHT: 210 LBS

## 2021-10-05 DIAGNOSIS — L97.519 ULCER OF RIGHT GREAT TOE DUE TO DIABETES MELLITUS (HCC): Primary | ICD-10-CM

## 2021-10-05 DIAGNOSIS — M79.674 PAIN IN TOE OF RIGHT FOOT: ICD-10-CM

## 2021-10-05 DIAGNOSIS — E11.42 DIABETIC POLYNEUROPATHY ASSOCIATED WITH TYPE 2 DIABETES MELLITUS (HCC): ICD-10-CM

## 2021-10-05 DIAGNOSIS — E11.621 ULCER OF RIGHT GREAT TOE DUE TO DIABETES MELLITUS (HCC): Primary | ICD-10-CM

## 2021-10-05 PROCEDURE — 99213 OFFICE O/P EST LOW 20 MIN: CPT | Performed by: PODIATRIST

## 2021-10-05 PROCEDURE — 3046F HEMOGLOBIN A1C LEVEL >9.0%: CPT | Performed by: PODIATRIST

## 2021-10-05 PROCEDURE — 4004F PT TOBACCO SCREEN RCVD TLK: CPT | Performed by: PODIATRIST

## 2021-10-05 PROCEDURE — G8484 FLU IMMUNIZE NO ADMIN: HCPCS | Performed by: PODIATRIST

## 2021-10-05 PROCEDURE — 3017F COLORECTAL CA SCREEN DOC REV: CPT | Performed by: PODIATRIST

## 2021-10-05 PROCEDURE — G8417 CALC BMI ABV UP PARAM F/U: HCPCS | Performed by: PODIATRIST

## 2021-10-05 PROCEDURE — G8427 DOCREV CUR MEDS BY ELIG CLIN: HCPCS | Performed by: PODIATRIST

## 2021-10-05 PROCEDURE — 2022F DILAT RTA XM EVC RTNOPTHY: CPT | Performed by: PODIATRIST

## 2021-10-05 ASSESSMENT — ENCOUNTER SYMPTOMS
BACK PAIN: 0
COLOR CHANGE: 0
DIARRHEA: 0
SHORTNESS OF BREATH: 0
NAUSEA: 0

## 2021-10-05 NOTE — PROGRESS NOTES
[facility] Podiatry  Return Patient Progress Note    Subjective: Sherren Patee 46 y.o. male that presents for follow up evaluation of wound to the right great toe. Chief Complaint   Patient presents with   [address] [place] [address]     Patient's treatment thus far has included daily dressing changes with Rosette and a band aid. Pain is rated 2 out of 10 and is described as intermittent. Patient has been following my prescribed course of therapy as instructed. Review of Systems   Constitutional: Negative for activity change, appetite change, chills, diaphoresis, fatigue and fever. Respiratory: Negative for shortness of breath. Cardiovascular: Negative for leg swelling. Gastrointestinal: Negative for diarrhea and nausea. Endocrine: Negative for cold intolerance, heat intolerance and polyuria. Musculoskeletal: Positive for arthralgias. Negative for back pain, gait problem, joint swelling and myalgias. Skin: Positive for wound. Negative for color change, pallor and rash. Allergic/Immunologic: Negative for environmental allergies and food allergies. Neurological: Negative for dizziness, weakness, light-headedness and numbness. Hematological: Does not bruise/bleed easily. Psychiatric/Behavioral: Negative for behavioral problems, confusion and self-injury. The patient is not nervous/anxious. Objective: Clinical evaluation of the patient reveals eschar formation to the plantar aspect of the right hallux. There is no lysis noted to this eschar upon manipulation. There is no erythema or calor surrounding this area. There is no drainage or malodor noted to the right hallux. There is no pain with palpation or manipulation of the right hallux. Assessment:    Diagnosis Orders   1. Ulcer of right great toe due to diabetes mellitus (HCC)     2. Pain in toe of right foot     3. Diabetic polyneuropathy associated with type 2 diabetes mellitus ([facility]           Plan: 1.  Clinical evaluation of the patient. 2. Patient informed that the wound to the right hallux appears adequately healed at this time. I applied a band aid to the right hallux without any antibiotic ointment. Patient advised to remove this band aid this evening and if there is no drainage noted, then he may discontinue dressing changes. However, if there is drainage, then the patient is to change the dressing daily with antibiotic ointment and a band aid. 3. Return in about 1 week (around 10/12/2021) for Wound Care.    10/5/2021      Palmira Moseley DPM

## 2021-10-12 ENCOUNTER — OFFICE VISIT (OUTPATIENT)
Dept: PODIATRY | Age: 51
End: 2021-10-12
Payer: COMMERCIAL

## 2021-10-12 VITALS — BODY MASS INDEX: 28.44 KG/M2 | HEIGHT: 72 IN | WEIGHT: 210 LBS

## 2021-10-12 DIAGNOSIS — L97.519 ULCER OF RIGHT GREAT TOE DUE TO DIABETES MELLITUS (HCC): Primary | ICD-10-CM

## 2021-10-12 DIAGNOSIS — E11.42 DIABETIC POLYNEUROPATHY ASSOCIATED WITH TYPE 2 DIABETES MELLITUS (HCC): ICD-10-CM

## 2021-10-12 DIAGNOSIS — E11.621 ULCER OF RIGHT GREAT TOE DUE TO DIABETES MELLITUS (HCC): Primary | ICD-10-CM

## 2021-10-12 DIAGNOSIS — M79.674 PAIN IN TOE OF RIGHT FOOT: ICD-10-CM

## 2021-10-12 PROCEDURE — 2022F DILAT RTA XM EVC RTNOPTHY: CPT | Performed by: PODIATRIST

## 2021-10-12 PROCEDURE — 4004F PT TOBACCO SCREEN RCVD TLK: CPT | Performed by: PODIATRIST

## 2021-10-12 PROCEDURE — G8417 CALC BMI ABV UP PARAM F/U: HCPCS | Performed by: PODIATRIST

## 2021-10-12 PROCEDURE — G8427 DOCREV CUR MEDS BY ELIG CLIN: HCPCS | Performed by: PODIATRIST

## 2021-10-12 PROCEDURE — 3017F COLORECTAL CA SCREEN DOC REV: CPT | Performed by: PODIATRIST

## 2021-10-12 PROCEDURE — G8484 FLU IMMUNIZE NO ADMIN: HCPCS | Performed by: PODIATRIST

## 2021-10-12 PROCEDURE — 3046F HEMOGLOBIN A1C LEVEL >9.0%: CPT | Performed by: PODIATRIST

## 2021-10-12 PROCEDURE — 99213 OFFICE O/P EST LOW 20 MIN: CPT | Performed by: PODIATRIST

## 2021-10-14 ASSESSMENT — ENCOUNTER SYMPTOMS
SHORTNESS OF BREATH: 0
BACK PAIN: 0
DIARRHEA: 0
COLOR CHANGE: 0
NAUSEA: 0

## 2021-10-14 NOTE — PROGRESS NOTES
[facility] Podiatry  Return Patient Progress Note    Subjective: Oleg Ran 46 y.o. male that presents for follow up evaluation of wound to the bottom of the right great toe. Chief Complaint   Patient presents with    Wound Check     rigth hallux    Patient's treatment thus far has included daily dressing changes with Rosette and a band aid. Pain is rated 0 out of 10 and is described as none. Patient has been following my prescribed course of therapy as instructed. Patient states that he thinks the wound has healed. Review of Systems   Constitutional: Negative for activity change, appetite change, chills, diaphoresis, fatigue and fever. Respiratory: Negative for shortness of breath. Cardiovascular: Negative for leg swelling. Gastrointestinal: Negative for diarrhea and nausea. Endocrine: Negative for cold intolerance, heat intolerance and polyuria. Musculoskeletal: Positive for arthralgias. Negative for back pain, gait problem, joint swelling and myalgias. Skin: Positive for wound. Negative for color change, pallor and rash. Allergic/Immunologic: Negative for environmental allergies and food allergies. Neurological: Negative for dizziness, weakness, light-headedness and numbness. Hematological: Does not bruise/bleed easily. Psychiatric/Behavioral: Negative for behavioral problems, confusion and self-injury. The patient is not nervous/anxious. Objective: Clinical evaluation of the patient reveals mild eschar formation to the plantar aspect of the right hallux. There is no lysis noted to this eschar upon manipulation. There is no erythema or calor surrounding this area. There is no drainage or malodor noted to the right hallux. There is no pain with palpation or manipulation of the right hallux. Assessment:    Diagnosis Orders   1. Ulcer of right great toe due to diabetes mellitus (HCC)     2. Pain in toe of right foot     3.  Diabetic polyneuropathy associated with type 2 diabetes mellitus ([facility]           Plan: 1. Clinical evaluation of the patient. 2. Patient informed that he has adequately healed and may discontinue dressing changes at this time. 3. Return if symptoms worsen or fail to improve.    10/12/2021      Billie Gerard DPM

## 2021-11-08 ENCOUNTER — OFFICE VISIT (OUTPATIENT)
Dept: PODIATRY | Age: 51
End: 2021-11-08
Payer: COMMERCIAL

## 2021-11-08 VITALS — HEIGHT: 72 IN | BODY MASS INDEX: 30.9 KG/M2

## 2021-11-08 DIAGNOSIS — R23.4 ESCHAR OF TOE: Primary | ICD-10-CM

## 2021-11-08 DIAGNOSIS — E11.42 DIABETIC POLYNEUROPATHY ASSOCIATED WITH TYPE 2 DIABETES MELLITUS (HCC): ICD-10-CM

## 2021-11-08 PROCEDURE — G8484 FLU IMMUNIZE NO ADMIN: HCPCS | Performed by: PODIATRIST

## 2021-11-08 PROCEDURE — 4004F PT TOBACCO SCREEN RCVD TLK: CPT | Performed by: PODIATRIST

## 2021-11-08 PROCEDURE — 99213 OFFICE O/P EST LOW 20 MIN: CPT | Performed by: PODIATRIST

## 2021-11-08 PROCEDURE — 3017F COLORECTAL CA SCREEN DOC REV: CPT | Performed by: PODIATRIST

## 2021-11-08 PROCEDURE — 2022F DILAT RTA XM EVC RTNOPTHY: CPT | Performed by: PODIATRIST

## 2021-11-08 PROCEDURE — G8417 CALC BMI ABV UP PARAM F/U: HCPCS | Performed by: PODIATRIST

## 2021-11-08 PROCEDURE — G8427 DOCREV CUR MEDS BY ELIG CLIN: HCPCS | Performed by: PODIATRIST

## 2021-11-08 PROCEDURE — 3046F HEMOGLOBIN A1C LEVEL >9.0%: CPT | Performed by: PODIATRIST

## 2021-11-09 ASSESSMENT — ENCOUNTER SYMPTOMS
SHORTNESS OF BREATH: 0
COLOR CHANGE: 0
NAUSEA: 0
BACK PAIN: 0
DIARRHEA: 0

## 2021-11-09 NOTE — PROGRESS NOTES
[facility] Podiatry  Return Patient Progress Note    Subjective: Drea Frey 46 y.o. male that presents for follow up evaluation of wound to the right great toe. Chief Complaint   Patient presents with    Wound Check     right hallux     Diabetes     blood sugar 271     Patient's treatment thus far has included return to regular shoe wear and activity. Patient states that the wound remains healed, but he has a scab to the area still. Pain is rated 0 out of 10 and is described as none. Patient has been following my prescribed course of therapy as instructed. Review of Systems   Constitutional: Negative for activity change, appetite change, chills, diaphoresis, fatigue and fever. Respiratory: Negative for shortness of breath. Cardiovascular: Negative for leg swelling. Gastrointestinal: Negative for diarrhea and nausea. Endocrine: Negative for cold intolerance, heat intolerance and polyuria. Musculoskeletal: Positive for arthralgias. Negative for back pain, gait problem, joint swelling and myalgias. Skin: Negative for color change, pallor, rash and wound. Allergic/Immunologic: Negative for environmental allergies and food allergies. Neurological: Negative for dizziness, weakness, light-headedness and numbness. Hematological: Does not bruise/bleed easily. Psychiatric/Behavioral: Negative for behavioral problems, confusion and self-injury. The patient is not nervous/anxious. Objective: Clinical evaluation of the patient reveals eschar formation to the plantar aspect of the right hallux. This eschar is partially lytic. There is no surrounding erythema or calor noted to this area. Debridement of the eschar with a fifteen blade does not reveal any open wound. There is no drainage or malodor noted to the area. There is no pain today with palpation or debridement of this tissue. Assessment:    Diagnosis Orders   1. Eschar of toe     2.  Diabetic polyneuropathy associated with type 2 diabetes mellitus ([facility]           Plan: 1. Clinical evaluation of the patient. 2. Patient informed that his wound remains adequately healed and he may continue with regular shoe wear and activity. 3. Return if symptoms worsen or fail to improve.    11/8/2021      Isabel Zafar DPM

## 2021-12-28 ENCOUNTER — NURSE TRIAGE (OUTPATIENT)
Dept: OTHER | Facility: CLINIC | Age: 51
End: 2021-12-28

## 2021-12-28 NOTE — TELEPHONE ENCOUNTER
Received call from [place] at [facility] with [employer] Complaint. Subjective: Caller states \"I went into  a couple weeks ago for a cough, shortness of breath, dizziness, fatigue and weakness. They gave me a cough syrup that lasted for 3 days, that didn't help and made me cough more. \"      Current Symptoms: Cough, SOB, fatigue, dizziness. Onset: 2 weeks ago; worsening    Associated Symptoms: NA    Pain Severity: 0/10; denies pain; none    Temperature: denies fever by unknown method    What has been tried: Prescription cough medicine, no improvement. LMP: NA Pregnant: NA    Recommended disposition: Go to [facility], go to [facility] for secondary dispo. Care advice provided, patient verbalizes understanding; denies any other questions or concerns; instructed to call back for any new or worsening symptoms. Mirna Walsh at [facility] calling patient to schedule     Attention Provider: Thank you for allowing me to participate in the care of your patient. The patient was connected to triage in response to information provided to the ECC/PSC. Please do not respond through this encounter as the response is not directed to a shared pool.       Reason for Disposition   MILD difficulty breathing (e.g., minimal/no SOB at rest, SOB with walking, pulse < 100) of new-onset or worse than normal    Protocols used: BREATHING DIFFICULTY-ADULT-[place]

## 2022-04-27 ENCOUNTER — HOSPITAL ENCOUNTER (OUTPATIENT)
Age: 52
Setting detail: SPECIMEN
Discharge: HOME | End: 2022-04-27

## 2022-04-27 PROBLEM — I10 ESSENTIAL HYPERTENSION: Status: ACTIVE | Noted: 2022-04-27

## 2022-04-27 PROBLEM — I50.22 CHRONIC SYSTOLIC CHF (CONGESTIVE HEART FAILURE) (HCC): Status: ACTIVE | Noted: 2022-04-27

## 2022-04-27 PROBLEM — E78.5 DYSLIPIDEMIA: Status: ACTIVE | Noted: 2022-04-27

## 2022-04-28 DIAGNOSIS — Z79.4 TYPE 2 DIABETES MELLITUS WITH DIABETIC NEUROPATHY, WITH LONG-TERM CURRENT USE OF INSULIN (HCC): ICD-10-CM

## 2022-04-28 DIAGNOSIS — E11.40 TYPE 2 DIABETES MELLITUS WITH DIABETIC NEUROPATHY, WITH LONG-TERM CURRENT USE OF INSULIN (HCC): ICD-10-CM

## 2022-04-28 LAB
CREATININE URINE: 81.3 MG/DL (ref 39–259)
MICROALBUMIN/CREAT 24H UR: 42 MG/L
MICROALBUMIN/CREAT UR-RTO: 52 MCG/MG CREAT

## 2022-10-07 ENCOUNTER — HOSPITAL ENCOUNTER (OUTPATIENT)
Age: 52
Setting detail: SPECIMEN
Discharge: HOME | End: 2022-10-07

## 2022-10-07 DIAGNOSIS — Z11.59 NEED FOR HEPATITIS C SCREENING TEST: ICD-10-CM

## 2022-10-07 LAB — HEPATITIS C ANTIBODY: REACTIVE

## 2023-01-03 ENCOUNTER — OFFICE VISIT (OUTPATIENT)
Dept: GASTROENTEROLOGY | Age: 53
End: 2023-01-03
Payer: COMMERCIAL

## 2023-01-03 VITALS
HEART RATE: 77 BPM | BODY MASS INDEX: 29.84 KG/M2 | SYSTOLIC BLOOD PRESSURE: 137 MMHG | WEIGHT: 220 LBS | TEMPERATURE: 97.5 F | DIASTOLIC BLOOD PRESSURE: 88 MMHG

## 2023-01-03 DIAGNOSIS — Z11.59 SCREENING FOR VIRAL DISEASE: ICD-10-CM

## 2023-01-03 DIAGNOSIS — R76.8 POSITIVE HEPATITIS C ANTIBODY TEST: Primary | ICD-10-CM

## 2023-01-03 PROCEDURE — 3075F SYST BP GE 130 - 139MM HG: CPT | Performed by: INTERNAL MEDICINE

## 2023-01-03 PROCEDURE — 3079F DIAST BP 80-89 MM HG: CPT | Performed by: INTERNAL MEDICINE

## 2023-01-03 PROCEDURE — 99204 OFFICE O/P NEW MOD 45 MIN: CPT | Performed by: INTERNAL MEDICINE

## 2023-01-03 RX ORDER — WARFARIN SODIUM 3 MG/1
TABLET ORAL
COMMUNITY
Start: 2022-12-15

## 2023-01-03 ASSESSMENT — ENCOUNTER SYMPTOMS
VOMITING: 0
TROUBLE SWALLOWING: 0
NAUSEA: 0
ABDOMINAL PAIN: 0
COUGH: 0
BLOOD IN STOOL: 0
RECTAL PAIN: 0
SHORTNESS OF BREATH: 0
CONSTIPATION: 0
WHEEZING: 0
ABDOMINAL DISTENTION: 0
ANAL BLEEDING: 0
CHOKING: 0
DIARRHEA: 0

## 2023-01-03 NOTE — PROGRESS NOTES
Reason for Referral:   Jess Philippe, APRN - CNP  [address]  [address] [facility],[address]  [place],  [address] [place] [address]    Chief Complaint   Patient presents with    Hepatitis C     Patient is new, referred for positive Hep C ab test. Patient states he is unsure how he was exposed to Hep C. He denies any tattoos, street drugs or blood transfusions. HISTORY OF PRESENT ILLNESS: Kim Arvizu is a 45 y.o. male , referred for evaluation of hep C    Here for the first time   Found out hep C in Nov   No hx of IVDA  Normal LFTS last year   Positive Hep C ab now   [place] guard negative wit last year 3/2022      Past Medical,Family, and Social History reviewed and does contribute to the patient presentingcondition. Patient's PMH/PSH,SH,PSYCH Hx, MEDs, ALLERGIES, and ROS were all reviewed and updated in the appropriate sections.     PAST MEDICAL HISTORY:  Past Medical History:   Diagnosis Date    Chronic systolic CHF (congestive heart failure) ([facility] 4/27/2022    Diabetic peripheral neuropathy ([facility] 1/14/2014    Essential hypertension 4/27/2022    Type II or unspecified type diabetes mellitus without mention of complication, not stated as uncontrolled        Past Surgical History:   Procedure Laterality Date    HAND REPLANTATION Left 6/7/89       CURRENT MEDICATIONS:    Current Outpatient Medications:     warfarin (COUMADIN) 3 MG tablet, take 2-3 tablets by mouth every evening OR AS DIRECTED BY MEDICATION THERAPY MANAGEMENT, Disp: , Rfl:     gabapentin (NEURONTIN) 100 MG capsule, take 1 capsule by mouth twice a day, Disp: 180 capsule, Rfl: 0    glimepiride (AMARYL) 4 MG tablet, take 1 tablet by mouth once daily with dinner, Disp: 90 tablet, Rfl: 1    nitroGLYCERIN (NITROSTAT) 0.4 MG SL tablet, , Disp: , Rfl:     Insulin Glargine, 2 Unit Dial, (TOUJESAMY WISE SOLOSTAR) 300 UNIT/ML SOPN, inject 65 units subcutaneously once daily, Disp: 18 mL, Rfl: 0    dapagliflozin (FARXIGA) 10 MG tablet, Take 1 tablet by mouth every morning, Disp: 90 tablet, Rfl: 1    rosuvastatin (CRESTOR) 20 MG tablet, Take 1 tablet by mouth daily, Disp: 90 tablet, Rfl: 1    metoprolol succinate (TOPROL XL) 25 MG extended release tablet, Take 1 tablet by mouth daily, Disp: 90 tablet, Rfl: 1    furosemide (LASIX) 20 MG tablet, Take 1 tablet by mouth daily, Disp: 90 tablet, Rfl: 1    aspirin EC 81 MG EC tablet, Take 1 tablet by mouth daily, Disp: 90 tablet, Rfl: 1    sacubitril-valsartan (ENTRESTO) 24-26 MG per tablet, Take 1 tablet by mouth 2 times daily, Disp: 180 tablet, Rfl: 1    fexofenadine-pseudoephedrine (ALLEGRA-D 24HR) 180-240 MG per extended release tablet, Take 1 tablet by mouth daily, Disp: 10 tablet, Rfl: 0    Insulin Syringe-Needle U-100 31G X 5/16\" 1 ML MISC, 1 each by Does not apply route daily. , Disp: 100 each, Rfl: 3    Insulin Pen Needle 29G X 12.7MM MISC, 1 each by Does not apply route daily. , Disp: 100 each, Rfl: 3    ALLERGIES:   No Known Allergies    FAMILY HISTORY:       Problem Relation Age of Onset    Diabetes Mother     Heart Disease Father     Cancer Father     Diabetes Maternal Grandmother          SOCIAL HISTORY:   Social History     Socioeconomic History    Marital status:      Spouse name: Not on file    Number of children: Not on file    Years of education: Not on file    Highest education level: Not on file   Occupational History    Not on file   Tobacco Use    Smoking status: Never    Smokeless tobacco: Former     Types: Chew     Quit date: 12/27/2021   Vaping Use    Vaping Use: Never used   Substance and Sexual Activity    Alcohol use:  Yes     Alcohol/week: 0.0 standard drinks     Comment: ossacionally    Drug use: Never    Sexual activity: Not on file   Other Topics Concern    Not on file   Social History Narrative    Not on file     Social Determinants of Health     Financial Resource Strain: Low Risk     Difficulty of Paying Living Expenses: Not hard at all   Food Insecurity: No Food Insecurity    Worried About Running Out of Food in the Last Year: Never true    Ran Out of Food in the Last Year: Never true   Transportation Needs: Not on file   Physical Activity: Not on file   Stress: Not on file   Social Connections: Not on file   Intimate Partner Violence: Not on file   Housing Stability: Not on file       REVIEW OF SYSTEMS: A 12-point review of systemswas obtained and pertinent positives and negatives were enumerated above in the history of present illness. All other reviewed systems / symptoms were negative. Review of Systems   Constitutional:  Negative for appetite change, fatigue and unexpected weight change. HENT:  Negative for trouble swallowing. Eyes:  Positive for visual disturbance (glasses). Respiratory:  Negative for cough, choking, shortness of breath and wheezing. Cardiovascular:  Negative for chest pain, palpitations and leg swelling. Gastrointestinal:  Negative for abdominal distention, abdominal pain, anal bleeding, blood in stool, constipation, diarrhea, nausea, rectal pain and vomiting. Genitourinary:  Negative for difficulty urinating. Allergic/Immunologic: Negative for environmental allergies and food allergies. Neurological:  Negative for dizziness, weakness, light-headedness, numbness and headaches. Hematological:  Does not bruise/bleed easily. Psychiatric/Behavioral:  Negative for sleep disturbance. The patient is not nervous/anxious.           LABORATORY DATA: Reviewed  Lab Results   Component Value Date    WBC 10.1 07/08/2021    HGB 14.1 07/08/2021    HCT 43.2 07/08/2021    MCV 83.9 07/08/2021     07/08/2021     (L) 07/08/2021    K 4.7 07/08/2021     07/08/2021    CO2 25 07/08/2021    BUN 23 (H) 07/08/2021    CREATININE 0.75 07/08/2021    LABALBU 4.0 07/08/2021    BILITOT 0.45 07/08/2021    ALKPHOS 121 07/08/2021    AST 17 07/08/2021    ALT 17 07/08/2021         Lab Results   Component Value Date    RBC 5.15 07/08/2021    HGB 14.1 07/08/2021    MCV 83.9 07/08/2021 MCH 27.4 07/08/2021    MCHC 32.6 07/08/2021    RDW 12.8 07/08/2021    MPV 10.2 07/08/2021    BASOPCT 0 07/08/2021    LYMPHSABS 1.73 07/08/2021    MONOSABS 0.70 07/08/2021    NEUTROABS 7.60 07/08/2021    EOSABS 0.03 07/08/2021    BASOSABS <0.03 07/08/2021         DIAGNOSTIC TESTING:     No results found. /88   Pulse 77   Temp 97.5 °F (36.4 °C)   Wt 220 lb (99.8 kg)   BMI 29.84 kg/m²     PHYSICAL EXAMINATION: Vital signs reviewed per the nursing documentation. Body mass index is 29.84 kg/m². Physical Exam  Vitals and nursing note reviewed. Constitutional:       General: He is not in acute distress. Appearance: He is well-developed. He is not diaphoretic. HENT:      Head: Normocephalic and atraumatic. Eyes:      General: No scleral icterus. Pupils: Pupils are equal, round, and reactive to light. Neck:      Thyroid: No thyromegaly. Vascular: No JVD. Trachea: No tracheal deviation. Cardiovascular:      Rate and Rhythm: Normal rate and regular rhythm. Heart sounds: Normal heart sounds. No murmur heard. Pulmonary:      Effort: Pulmonary effort is normal. No respiratory distress. Breath sounds: Normal breath sounds. No wheezing. Abdominal:      General: Bowel sounds are normal. There is no distension. Palpations: Abdomen is soft. There is no mass. Tenderness: There is no abdominal tenderness. There is no guarding or rebound. Musculoskeletal:         General: No tenderness. Normal range of motion. Cervical back: Normal range of motion and neck supple. Skin:     General: Skin is warm. Coloration: Skin is not pale. Findings: No erythema or rash. Comments: He is not diaphoretic   Neurological:      Mental Status: He is alert and oriented to person, place, and time. Deep Tendon Reflexes: Reflexes are normal and symmetric. Psychiatric:         Behavior: Behavior normal.         Thought Content:  Thought content normal. Judgment: Judgment normal.       IMPRESSION: Mr. Chris Krueger is a 45 y.o. male with      Diagnosis Orders   1. Positive hepatitis C antibody test  Hepatitis B Surface Antigen    Hepatitis B Surface Antibody    Hepatitis B Core Antibody, Total    Hepatitis A Antibody, Total    Hepatitis C RNA, quantitative, PCR    HEPATITIS C GENOTYPING    Liver Fibrosis, Chronic Viral Hepatitis    HIV Screen    CBC with Auto Differential    Comprehensive Metabolic Panel with Bilirubin    US LIVER    AFP Tumor Marker      2. Screening for viral disease  Hepatitis B Surface Antigen    Hepatitis B Surface Antibody    Hepatitis B Core Antibody, Total    Hepatitis A Antibody, Total    HIV Screen        Will proceed with the above in preparation for treatment  As mentioned the patient had screening already for colon cancer    My patient has been free from illicit substances, controlled drugs and alcohol for a lifetime and will remain sober throughout treatment and thereafter. I have discussed the importance of abstaining from illicit substances and alcohol for the duration of treatment and thereafter. I have offered counseling and have provided referrals if he/she chooses to utilize them. My patient has been educated and understands the ways to prevent exposure and transmission of Hepatitis C disease. My patient does not have limited life expectancy of less than 12 months due to non-liver-related comorbid conditions. If my patient has any drug-to-drug interactions, these will be addressed by myself and pharmacy. If being prescribed Mavyret, patient will not be given any ribavirin. If patient is on statins that interact, this will be held by the patient until hepatitis treatment is complete. If my patient is prescribed Epclusa, he/she will hold PPI during hepatitis treatment if there is one prescribed.     My patient has received the anticipated dosing plan for Hepatitis C medication and follow-up schedule and agrees to be compliant and will not miss any doses of the prescribed medications. My patient fully understands that he/she is responsible for making arrangements to obtain specified laboratory blood work every 4 weeks (HCV RNA), and 12 weeks post treatment. If other labs are requested, my patient understands that he/she is responsible for making arrangements to obtain those as well. Pt will be prescribed Epclusa 1 tab daily for 12 weeks. Diet/life style/natural hx /complication of the dx were all explained in details   Past medical, past surgical, social history, psychiatric history, medications or allergies, all reviewed and  updated        Thank you for allowing me to participate in the care of Mr. Brandy Isidro. For any further questions please do not hesitate to contact me. I have reviewed and agree with the MA/RN ROS. Note is dictated utilizing voice recognition software. Unfortunately this leads to occasional typographical errors. Please contact our office if you have any questions.     Chetan Villareal MD  [facility] Gastroenterology  O: #[phone]

## 2023-01-05 ENCOUNTER — HOSPITAL ENCOUNTER (OUTPATIENT)
Dept: ULTRASOUND IMAGING | Age: 53
Discharge: HOME | End: 2023-01-07
Payer: COMMERCIAL

## 2023-01-05 ENCOUNTER — HOSPITAL ENCOUNTER (OUTPATIENT)
Age: 53
Discharge: HOME | End: 2023-01-05
Payer: COMMERCIAL

## 2023-01-05 DIAGNOSIS — R76.8 POSITIVE HEPATITIS C ANTIBODY TEST: ICD-10-CM

## 2023-01-05 DIAGNOSIS — Z11.59 SCREENING FOR VIRAL DISEASE: ICD-10-CM

## 2023-01-05 LAB
ABSOLUTE EOS #: 0.2 K/UL (ref 0–0.4)
ABSOLUTE LYMPH #: 1.9 K/UL (ref 1–4.8)
ABSOLUTE MONO #: 0.4 K/UL (ref 0.1–1.3)
AFP: 1.9 UG/L
ALANINE AMINOTRANSFERASE, FIBROMETER: NORMAL
ALBUMIN SERPL-MCNC: 4 G/DL (ref 3.5–5.2)
ALP BLD-CCNC: 125 U/L (ref 40–129)
ALPHA-2-MACROGLOBULIN, FIBROMETER: NORMAL
ALT SERPL-CCNC: 27 U/L (ref 5–41)
ANION GAP SERPL CALCULATED.3IONS-SCNC: 8 MMOL/L (ref 9–17)
ASPARTATE AMINOTRANSFERASE, FIBROMETER: NORMAL
AST SERPL-CCNC: 27 U/L
BASOPHILS # BLD: 0 % (ref 0–2)
BASOPHILS ABSOLUTE: 0 K/UL (ref 0–0.2)
BILIRUB SERPL-MCNC: 0.4 MG/DL (ref 0.3–1.2)
BILIRUBIN DIRECT: <0.1 MG/DL
BILIRUBIN, INDIRECT: ABNORMAL MG/DL (ref 0–1)
BUN BLDV-MCNC: 21 MG/DL (ref 6–20)
CALCIUM SERPL-MCNC: 8.9 MG/DL (ref 8.6–10.4)
CHLORIDE BLD-SCNC: 106 MMOL/L (ref 98–107)
CIRRHOMETER PATIENT SCORE: NORMAL
CO2: 28 MMOL/L (ref 20–31)
CREAT SERPL-MCNC: 1.01 MG/DL (ref 0.7–1.2)
EER FIBROMETER REPORT: NORMAL
EOSINOPHILS RELATIVE PERCENT: 4 % (ref 0–4)
FIBROMETER INTERPRETATION: NORMAL
FIBROMETER PATIENT SCORE: NORMAL
FIBROMETER PLATELET COUNT: 149
FIBROMETER PROTHROMBIN INDEX: NORMAL
FIBROSIS METAVIR CLASSIFICATION: NORMAL
GAMMA GLUTAMYL TRANSFERASE, FIBROMETER: NORMAL
GFR SERPL CREATININE-BSD FRML MDRD: >60 ML/MIN/1.73M2
GLUCOSE BLD-MCNC: 206 MG/DL (ref 70–99)
HAV AB SERPL IA-ACNC: NONREACTIVE
HBV SURFACE AB TITR SER: <3.5 MIU/ML
HCT VFR BLD CALC: 43.4 % (ref 41–53)
HEMOGLOBIN: 14.3 G/DL (ref 13.5–17.5)
HEPATITIS B CORE TOTAL ANTIBODY: NONREACTIVE
HEPATITIS B SURFACE ANTIGEN: NONREACTIVE
HIV AG/AB: NONREACTIVE
INFLAMETER METAVIR CLASSIFICATION: NORMAL
INFLAMETER PATIENT SCORE: NORMAL
LYMPHOCYTES # BLD: 34 % (ref 24–44)
MCH RBC QN AUTO: 26.2 PG (ref 26–34)
MCHC RBC AUTO-ENTMCNC: 32.9 G/DL (ref 31–37)
MCV RBC AUTO: 79.7 FL (ref 80–100)
MONOCYTES # BLD: 8 % (ref 1–7)
PDW BLD-RTO: 16.2 % (ref 11.5–14.9)
PLATELET # BLD: 149 K/UL (ref 150–450)
PMV BLD AUTO: 8.7 FL (ref 6–12)
POTASSIUM SERPL-SCNC: 3.7 MMOL/L (ref 3.7–5.3)
RBC # BLD: 5.45 M/UL (ref 4.5–5.9)
SEG NEUTROPHILS: 54 % (ref 36–66)
SEGMENTED NEUTROPHILS ABSOLUTE COUNT: 2.9 K/UL (ref 1.3–9.1)
SODIUM BLD-SCNC: 142 MMOL/L (ref 135–144)
TOTAL PROTEIN: 6.7 G/DL (ref 6.4–8.3)
UREA NITROGEN, FIBROMETER: NORMAL
WBC # BLD: 5.2 K/UL (ref 3.5–11)

## 2023-01-05 PROCEDURE — 86317 IMMUNOASSAY INFECTIOUS AGENT: CPT

## 2023-01-05 PROCEDURE — 87389 HIV-1 AG W/HIV-1&-2 AB AG IA: CPT

## 2023-01-05 PROCEDURE — 36415 COLL VENOUS BLD VENIPUNCTURE: CPT

## 2023-01-05 PROCEDURE — 76705 ECHO EXAM OF ABDOMEN: CPT

## 2023-01-05 PROCEDURE — 87902 NFCT AGT GNTYP ALYS HEP C: CPT

## 2023-01-05 PROCEDURE — 85025 COMPLETE CBC W/AUTO DIFF WBC: CPT

## 2023-01-05 PROCEDURE — 84520 ASSAY OF UREA NITROGEN: CPT

## 2023-01-05 PROCEDURE — 82105 ALPHA-FETOPROTEIN SERUM: CPT

## 2023-01-05 PROCEDURE — 80053 COMPREHEN METABOLIC PANEL: CPT

## 2023-01-05 PROCEDURE — 82248 BILIRUBIN DIRECT: CPT

## 2023-01-05 PROCEDURE — 87522 HEPATITIS C REVRS TRNSCRPJ: CPT

## 2023-01-05 PROCEDURE — 83883 ASSAY NEPHELOMETRY NOT SPEC: CPT

## 2023-01-05 PROCEDURE — 86708 HEPATITIS A ANTIBODY: CPT

## 2023-01-05 PROCEDURE — 84450 TRANSFERASE (AST) (SGOT): CPT

## 2023-01-05 PROCEDURE — 84460 ALANINE AMINO (ALT) (SGPT): CPT

## 2023-01-05 PROCEDURE — 86704 HEP B CORE ANTIBODY TOTAL: CPT

## 2023-01-05 PROCEDURE — 87340 HEPATITIS B SURFACE AG IA: CPT

## 2023-01-05 PROCEDURE — 82977 ASSAY OF GGT: CPT

## 2023-01-08 LAB
HEPATITIS C RNA PCR QUANT: NOT DETECTED
SOURCE: NORMAL

## 2023-01-23 ENCOUNTER — TELEPHONE (OUTPATIENT)
Dept: GASTROENTEROLOGY | Age: 53
End: 2023-01-23

## 2023-01-25 NOTE — TELEPHONE ENCOUNTER
Patient contacted and let him know he does not have Hepatitis C. His Antibody test shows reactive which means he was exposed one time in his life but his body fought it off. He will always be reactive but doesn't mean he has Hep C.

## 2023-04-19 ENCOUNTER — OFFICE VISIT (OUTPATIENT)
Dept: GASTROENTEROLOGY | Age: 53
End: 2023-04-19
Payer: COMMERCIAL

## 2023-04-19 VITALS
SYSTOLIC BLOOD PRESSURE: 118 MMHG | BODY MASS INDEX: 28.89 KG/M2 | WEIGHT: 213 LBS | TEMPERATURE: 97.4 F | HEART RATE: 74 BPM | DIASTOLIC BLOOD PRESSURE: 83 MMHG

## 2023-04-19 DIAGNOSIS — R76.8 POSITIVE HEPATITIS C ANTIBODY TEST: ICD-10-CM

## 2023-04-19 DIAGNOSIS — K74.00 LIVER FIBROSIS: Primary | ICD-10-CM

## 2023-04-19 PROCEDURE — 3074F SYST BP LT 130 MM HG: CPT | Performed by: INTERNAL MEDICINE

## 2023-04-19 PROCEDURE — 99214 OFFICE O/P EST MOD 30 MIN: CPT | Performed by: INTERNAL MEDICINE

## 2023-04-19 PROCEDURE — 3079F DIAST BP 80-89 MM HG: CPT | Performed by: INTERNAL MEDICINE

## 2023-04-19 ASSESSMENT — ENCOUNTER SYMPTOMS
ANAL BLEEDING: 0
SHORTNESS OF BREATH: 0
ABDOMINAL DISTENTION: 0
NAUSEA: 0
DIARRHEA: 0
RECTAL PAIN: 0
CHOKING: 0
TROUBLE SWALLOWING: 0
CONSTIPATION: 0
BLOOD IN STOOL: 0
VOMITING: 0
WHEEZING: 0
COUGH: 0
ABDOMINAL PAIN: 0

## 2023-04-19 NOTE — PROGRESS NOTES
recognition software. Unfortunately this leads to occasional typographical errors. Please contact our office if you have any questions.       Wanda Hicks MD  [facility] Gastroenterology  O: #[phone]

## 2023-04-25 ENCOUNTER — HOSPITAL ENCOUNTER (OUTPATIENT)
Dept: CT IMAGING | Age: 53
Discharge: HOME | End: 2023-04-27
Payer: COMMERCIAL

## 2023-04-25 DIAGNOSIS — K74.00 LIVER FIBROSIS: ICD-10-CM

## 2023-04-25 LAB
EGFR, POC: >60 ML/MIN/1.73M2
POC CREATININE: 0.94 MG/DL (ref 0.51–1.19)

## 2023-04-25 PROCEDURE — 2580000003 HC RX 258: Performed by: INTERNAL MEDICINE

## 2023-04-25 PROCEDURE — 74177 CT ABD & PELVIS W/CONTRAST: CPT

## 2023-04-25 PROCEDURE — 82565 ASSAY OF CREATININE: CPT

## 2023-04-25 PROCEDURE — 6360000004 HC RX CONTRAST MEDICATION: Performed by: INTERNAL MEDICINE

## 2023-04-25 RX ORDER — 0.9 % SODIUM CHLORIDE 0.9 %
100 INTRAVENOUS SOLUTION INTRAVENOUS ONCE
Status: COMPLETED | OUTPATIENT
Start: 2023-04-25 | End: 2023-04-25

## 2023-04-25 RX ORDER — SODIUM CHLORIDE 0.9 % (FLUSH) 0.9 %
10 SYRINGE (ML) INJECTION PRN
Status: DISCONTINUED | OUTPATIENT
Start: 2023-04-25 | End: 2023-04-28 | Stop reason: HOSPADM

## 2023-04-25 RX ADMIN — SODIUM CHLORIDE, PRESERVATIVE FREE 10 ML: 5 INJECTION INTRAVENOUS at 09:34

## 2023-04-25 RX ADMIN — IOPAMIDOL 75 ML: 755 INJECTION, SOLUTION INTRAVENOUS at 09:34

## 2023-04-25 RX ADMIN — SODIUM CHLORIDE 100 ML: 9 INJECTION, SOLUTION INTRAVENOUS at 09:33

## 2023-06-22 ENCOUNTER — HOSPITAL ENCOUNTER (OUTPATIENT)
Age: 53
Setting detail: SPECIMEN
Discharge: HOME | End: 2023-06-22

## 2023-06-22 DIAGNOSIS — E78.5 DYSLIPIDEMIA: ICD-10-CM

## 2023-06-22 DIAGNOSIS — I10 ESSENTIAL HYPERTENSION: ICD-10-CM

## 2023-06-22 DIAGNOSIS — Z79.4 TYPE 2 DIABETES MELLITUS WITH DIABETIC NEUROPATHY, WITH LONG-TERM CURRENT USE OF INSULIN (HCC): ICD-10-CM

## 2023-06-22 DIAGNOSIS — Z12.5 PROSTATE CANCER SCREENING: ICD-10-CM

## 2023-06-22 DIAGNOSIS — E11.40 TYPE 2 DIABETES MELLITUS WITH DIABETIC NEUROPATHY, WITH LONG-TERM CURRENT USE OF INSULIN (HCC): ICD-10-CM

## 2023-06-22 LAB
ALBUMIN SERPL-MCNC: 3.9 G/DL (ref 3.5–5.2)
ALBUMIN/GLOB SERPL: 1.6 {RATIO} (ref 1–2.5)
ALP SERPL-CCNC: 124 U/L (ref 40–129)
ALT SERPL-CCNC: 28 U/L (ref 5–41)
ANION GAP SERPL CALCULATED.3IONS-SCNC: 13 MMOL/L (ref 9–17)
AST SERPL-CCNC: 26 U/L
BILIRUB SERPL-MCNC: 0.3 MG/DL (ref 0.3–1.2)
BUN SERPL-MCNC: 22 MG/DL (ref 6–20)
CALCIUM SERPL-MCNC: 9 MG/DL (ref 8.6–10.4)
CHLORIDE SERPL-SCNC: 106 MMOL/L (ref 98–107)
CHOLEST SERPL-MCNC: 123 MG/DL
CHOLESTEROL/HDL RATIO: 2.8
CO2 SERPL-SCNC: 23 MMOL/L (ref 20–31)
CREAT SERPL-MCNC: 0.92 MG/DL (ref 0.7–1.2)
CREAT UR-MCNC: 61.4 MG/DL (ref 39–259)
ERYTHROCYTE [DISTWIDTH] IN BLOOD BY AUTOMATED COUNT: 14.4 % (ref 11.8–14.4)
GFR SERPL CREATININE-BSD FRML MDRD: >60 ML/MIN/1.73M2
GLUCOSE SERPL-MCNC: 175 MG/DL (ref 70–99)
HCT VFR BLD AUTO: 47.4 % (ref 40.7–50.3)
HDLC SERPL-MCNC: 44 MG/DL
HGB BLD-MCNC: 14.2 G/DL (ref 13–17)
LDLC SERPL CALC-MCNC: 61 MG/DL (ref 0–130)
MCH RBC QN AUTO: 26.6 PG (ref 25.2–33.5)
MCHC RBC AUTO-ENTMCNC: 30 G/DL (ref 28.4–34.8)
MCV RBC AUTO: 88.9 FL (ref 82.6–102.9)
MICROALBUMIN UR-MCNC: 55 MG/L
MICROALBUMIN/CREAT UR-RTO: 90 MCG/MG CREAT
NRBC AUTOMATED: 0 PER 100 WBC
PLATELET # BLD AUTO: 159 K/UL (ref 138–453)
PMV BLD AUTO: 11.6 FL (ref 8.1–13.5)
POTASSIUM SERPL-SCNC: 4 MMOL/L (ref 3.7–5.3)
PROT SERPL-MCNC: 6.4 G/DL (ref 6.4–8.3)
RBC # BLD AUTO: 5.33 M/UL (ref 4.21–5.77)
SODIUM SERPL-SCNC: 142 MMOL/L (ref 135–144)
TRIGL SERPL-MCNC: 88 MG/DL
WBC OTHER # BLD: 5.2 K/UL (ref 3.5–11.3)

## 2023-06-23 LAB — PSA SERPL-MCNC: 1.05 NG/ML

## 2023-10-19 ENCOUNTER — OFFICE VISIT (OUTPATIENT)
Dept: GASTROENTEROLOGY | Age: 53
End: 2023-10-19
Payer: COMMERCIAL

## 2023-10-19 VITALS
SYSTOLIC BLOOD PRESSURE: 127 MMHG | HEART RATE: 67 BPM | BODY MASS INDEX: 29.43 KG/M2 | DIASTOLIC BLOOD PRESSURE: 83 MMHG | HEIGHT: 72 IN

## 2023-10-19 DIAGNOSIS — R76.8 POSITIVE HEPATITIS C ANTIBODY TEST: ICD-10-CM

## 2023-10-19 DIAGNOSIS — K74.00 LIVER FIBROSIS: Primary | ICD-10-CM

## 2023-10-19 PROCEDURE — 99214 OFFICE O/P EST MOD 30 MIN: CPT | Performed by: INTERNAL MEDICINE

## 2023-10-19 PROCEDURE — 3074F SYST BP LT 130 MM HG: CPT | Performed by: INTERNAL MEDICINE

## 2023-10-19 PROCEDURE — 3079F DIAST BP 80-89 MM HG: CPT | Performed by: INTERNAL MEDICINE

## 2023-10-19 ASSESSMENT — ENCOUNTER SYMPTOMS
COUGH: 0
ANAL BLEEDING: 0
BLOOD IN STOOL: 0
NAUSEA: 0
CHOKING: 0
ABDOMINAL PAIN: 0
TROUBLE SWALLOWING: 0
RECTAL PAIN: 0
DIARRHEA: 0
WHEEZING: 0
VOMITING: 0
SHORTNESS OF BREATH: 0
CONSTIPATION: 0
ABDOMINAL DISTENTION: 0

## 2023-10-19 NOTE — PROGRESS NOTES
Thought Content: Thought content normal.         Judgment: Judgment normal.           IMPRESSION: Mr. Ender Orozco is a 52 y.o. male with      Diagnosis Orders   1. Liver fibrosis        2. Positive hepatitis C antibody test           He Will talk to Dr Odalys Robin about the finding on the CAT scan on the lung, as for screening for colon cancer he does Cologuard, he did that last year we informed him that he needs to do  [place] guard q 3 years   In a year or so we will repeat his imaging of the liver  And will take it from there       Medication where reviewed, side effects from the GI medication were reviewed with the patient  We did refill the GI medications            Diet/life style/natural hx /complication of the dx were all explained in details   Past medical, past surgical, social history, psychiatric history, medications or allergies, all reviewed and  updated    Thank you for allowing me to participate in the care of Mr. Ender Orozco. For any further questions please do not hesitate to contact me. I have reviewed and agree with the ROS entered by the MA/RN. Note is dictated utilizing voice recognition software. Unfortunately this leads to occasional typographical errors. Please contact our office if you have any questions. This note is created with the assistance of the speech recognition program. While intending to generate a document that actually reflects the content of the visit, it can still have some errors including those of syntax and sound-a-like substitutions which may escape proof reading. Actual meaning can be extrapolated by contextual inference.     Fabi Gacria MD  [facility] Gastroenterology  O: #[phone]

## 2024-08-05 ENCOUNTER — HOSPITAL ENCOUNTER (OUTPATIENT)
Age: 54
Setting detail: SPECIMEN
Discharge: HOME | End: 2024-08-05

## 2024-08-05 DIAGNOSIS — Z79.4 TYPE 2 DIABETES MELLITUS WITH DIABETIC NEUROPATHY, WITH LONG-TERM CURRENT USE OF INSULIN (HCC): ICD-10-CM

## 2024-08-05 DIAGNOSIS — Z12.5 PROSTATE CANCER SCREENING: ICD-10-CM

## 2024-08-05 DIAGNOSIS — E11.40 TYPE 2 DIABETES MELLITUS WITH DIABETIC NEUROPATHY, WITH LONG-TERM CURRENT USE OF INSULIN (HCC): ICD-10-CM

## 2024-08-05 DIAGNOSIS — I10 ESSENTIAL HYPERTENSION: ICD-10-CM

## 2024-08-05 DIAGNOSIS — E78.5 DYSLIPIDEMIA: ICD-10-CM

## 2024-08-05 LAB
ALBUMIN SERPL-MCNC: 4.4 G/DL (ref 3.5–5.2)
ALBUMIN/GLOB SERPL: 1 {RATIO} (ref 1–2.5)
ALP SERPL-CCNC: 113 U/L (ref 40–129)
ALT SERPL-CCNC: 30 U/L (ref 10–50)
ANION GAP SERPL CALCULATED.3IONS-SCNC: 10 MMOL/L (ref 9–16)
AST SERPL-CCNC: 30 U/L (ref 10–50)
BILIRUB SERPL-MCNC: 0.4 MG/DL (ref 0–1.2)
BUN SERPL-MCNC: 23 MG/DL (ref 6–20)
CALCIUM SERPL-MCNC: 9.4 MG/DL (ref 8.6–10.4)
CHLORIDE SERPL-SCNC: 103 MMOL/L (ref 98–107)
CHOLEST SERPL-MCNC: 149 MG/DL (ref 0–199)
CHOLESTEROL/HDL RATIO: 4
CO2 SERPL-SCNC: 28 MMOL/L (ref 20–31)
CREAT SERPL-MCNC: 1.2 MG/DL (ref 0.7–1.2)
CREAT UR-MCNC: 90.2 MG/DL (ref 39–259)
ERYTHROCYTE [DISTWIDTH] IN BLOOD BY AUTOMATED COUNT: 14 % (ref 11.8–14.4)
GFR, ESTIMATED: 74 ML/MIN/1.73M2
GLUCOSE SERPL-MCNC: 140 MG/DL (ref 74–99)
HCT VFR BLD AUTO: 50.2 % (ref 40.7–50.3)
HDLC SERPL-MCNC: 40 MG/DL
HGB BLD-MCNC: 16 G/DL (ref 13–17)
LDLC SERPL CALC-MCNC: 81 MG/DL (ref 0–100)
MCH RBC QN AUTO: 27.4 PG (ref 25.2–33.5)
MCHC RBC AUTO-ENTMCNC: 31.9 G/DL (ref 28.4–34.8)
MCV RBC AUTO: 86 FL (ref 82.6–102.9)
MICROALBUMIN UR-MCNC: 98 MG/L (ref 0–20)
MICROALBUMIN/CREAT UR-RTO: 109 MCG/MG CREAT (ref 0–17)
NRBC BLD-RTO: 0 PER 100 WBC
PLATELET # BLD AUTO: 179 K/UL (ref 138–453)
PMV BLD AUTO: 10.8 FL (ref 8.1–13.5)
POTASSIUM SERPL-SCNC: 3.9 MMOL/L (ref 3.7–5.3)
PROT SERPL-MCNC: 7.5 G/DL (ref 6.6–8.7)
PSA SERPL-MCNC: 1.5 NG/ML (ref 0–4)
RBC # BLD AUTO: 5.84 M/UL (ref 4.21–5.77)
SODIUM SERPL-SCNC: 141 MMOL/L (ref 136–145)
TRIGL SERPL-MCNC: 139 MG/DL (ref 0–149)
VLDLC SERPL CALC-MCNC: 28 MG/DL
WBC OTHER # BLD: 5.7 K/UL (ref 3.5–11.3)

## 2024-12-03 ENCOUNTER — HOSPITAL ENCOUNTER (EMERGENCY)
Age: 54
Discharge: HOME | End: 2024-12-03
Attending: EMERGENCY MEDICINE
Payer: COMMERCIAL

## 2024-12-03 ENCOUNTER — APPOINTMENT (OUTPATIENT)
Dept: CT IMAGING | Age: 54
End: 2024-12-03
Payer: COMMERCIAL

## 2024-12-03 VITALS
DIASTOLIC BLOOD PRESSURE: 85 MMHG | OXYGEN SATURATION: 100 % | WEIGHT: 194 LBS | BODY MASS INDEX: 26.28 KG/M2 | SYSTOLIC BLOOD PRESSURE: 116 MMHG | HEIGHT: 72 IN | RESPIRATION RATE: 16 BRPM | TEMPERATURE: 97.7 F | HEART RATE: 74 BPM

## 2024-12-03 DIAGNOSIS — R10.32 LEFT LOWER QUADRANT ABDOMINAL PAIN: Primary | ICD-10-CM

## 2024-12-03 LAB
ALBUMIN SERPL-MCNC: 4.1 G/DL (ref 3.5–5.2)
ALP SERPL-CCNC: 119 U/L (ref 40–129)
ALT SERPL-CCNC: 14 U/L (ref 10–50)
ANION GAP SERPL CALCULATED.3IONS-SCNC: 10 MMOL/L (ref 9–16)
AST SERPL-CCNC: 16 U/L (ref 10–50)
BASOPHILS # BLD: 0 K/UL (ref 0–0.2)
BASOPHILS NFR BLD: 0 % (ref 0–2)
BILIRUB SERPL-MCNC: 0.5 MG/DL (ref 0–1.2)
BUN SERPL-MCNC: 34 MG/DL (ref 6–20)
CALCIUM SERPL-MCNC: 9 MG/DL (ref 8.6–10.4)
CHLORIDE SERPL-SCNC: 101 MMOL/L (ref 98–107)
CO2 SERPL-SCNC: 24 MMOL/L (ref 20–31)
CREAT SERPL-MCNC: 1.2 MG/DL (ref 0.7–1.2)
EOSINOPHIL # BLD: 0.1 K/UL (ref 0–0.4)
EOSINOPHILS RELATIVE PERCENT: 3 % (ref 0–4)
ERYTHROCYTE [DISTWIDTH] IN BLOOD BY AUTOMATED COUNT: 14.9 % (ref 11.5–14.9)
GFR, ESTIMATED: 71 ML/MIN/1.73M2
GLUCOSE SERPL-MCNC: 390 MG/DL (ref 74–99)
HCT VFR BLD AUTO: 46.1 % (ref 41–53)
HGB BLD-MCNC: 15.1 G/DL (ref 13.5–17.5)
INR PPP: 2.1
LACTATE BLDV-SCNC: 1.5 MMOL/L (ref 0.5–2.2)
LYMPHOCYTES NFR BLD: 1.2 K/UL (ref 1–4.8)
LYMPHOCYTES RELATIVE PERCENT: 25 % (ref 24–44)
MCH RBC QN AUTO: 27.4 PG (ref 26–34)
MCHC RBC AUTO-ENTMCNC: 32.7 G/DL (ref 31–37)
MCV RBC AUTO: 84 FL (ref 80–100)
MONOCYTES NFR BLD: 0.5 K/UL (ref 0.1–1.3)
MONOCYTES NFR BLD: 11 % (ref 1–7)
NEUTROPHILS NFR BLD: 61 % (ref 36–66)
NEUTS SEG NFR BLD: 2.9 K/UL (ref 1.3–9.1)
PLATELET # BLD AUTO: 138 K/UL (ref 150–450)
PMV BLD AUTO: 8.6 FL (ref 6–12)
POTASSIUM SERPL-SCNC: 4.2 MMOL/L (ref 3.7–5.3)
PROT SERPL-MCNC: 6.8 G/DL (ref 6.6–8.7)
PROTHROMBIN TIME: 24 SEC (ref 11.8–14.6)
RBC # BLD AUTO: 5.49 M/UL (ref 4.5–5.9)
SODIUM SERPL-SCNC: 135 MMOL/L (ref 136–145)
WBC OTHER # BLD: 4.8 K/UL (ref 3.5–11)

## 2024-12-03 PROCEDURE — 83605 ASSAY OF LACTIC ACID: CPT

## 2024-12-03 PROCEDURE — 36415 COLL VENOUS BLD VENIPUNCTURE: CPT

## 2024-12-03 PROCEDURE — 80053 COMPREHEN METABOLIC PANEL: CPT

## 2024-12-03 PROCEDURE — 6360000004 HC RX CONTRAST MEDICATION: Performed by: EMERGENCY MEDICINE

## 2024-12-03 PROCEDURE — 85025 COMPLETE CBC W/AUTO DIFF WBC: CPT

## 2024-12-03 PROCEDURE — 85610 PROTHROMBIN TIME: CPT

## 2024-12-03 PROCEDURE — 74177 CT ABD & PELVIS W/CONTRAST: CPT

## 2024-12-03 PROCEDURE — 99285 EMERGENCY DEPT VISIT HI MDM: CPT

## 2024-12-03 PROCEDURE — 2580000003 HC RX 258: Performed by: EMERGENCY MEDICINE

## 2024-12-03 RX ORDER — IOPAMIDOL 755 MG/ML
75 INJECTION, SOLUTION INTRAVASCULAR
Status: COMPLETED | OUTPATIENT
Start: 2024-12-03 | End: 2024-12-03

## 2024-12-03 RX ORDER — DICYCLOMINE HYDROCHLORIDE 10 MG/1
10 CAPSULE ORAL
Qty: 30 CAPSULE | Refills: 0 | Status: SHIPPED | OUTPATIENT
Start: 2024-12-03

## 2024-12-03 RX ORDER — 0.9 % SODIUM CHLORIDE 0.9 %
100 INTRAVENOUS SOLUTION INTRAVENOUS ONCE
Status: COMPLETED | OUTPATIENT
Start: 2024-12-03 | End: 2024-12-03

## 2024-12-03 RX ORDER — SODIUM CHLORIDE 0.9 % (FLUSH) 0.9 %
10 SYRINGE (ML) INJECTION PRN
Status: DISCONTINUED | OUTPATIENT
Start: 2024-12-03 | End: 2024-12-03 | Stop reason: HOSPADM

## 2024-12-03 RX ADMIN — SODIUM CHLORIDE, PRESERVATIVE FREE 10 ML: 5 INJECTION INTRAVENOUS at 14:20

## 2024-12-03 RX ADMIN — SODIUM CHLORIDE 100 ML: 9 INJECTION, SOLUTION INTRAVENOUS at 14:21

## 2024-12-03 RX ADMIN — IOPAMIDOL 75 ML: 755 INJECTION, SOLUTION INTRAVENOUS at 14:20

## 2024-12-03 ASSESSMENT — ENCOUNTER SYMPTOMS
COUGH: 0
BACK PAIN: 0
ABDOMINAL PAIN: 1
DIARRHEA: 1

## 2024-12-03 ASSESSMENT — PAIN - FUNCTIONAL ASSESSMENT: PAIN_FUNCTIONAL_ASSESSMENT: 0-10

## 2024-12-03 ASSESSMENT — PAIN SCALES - GENERAL: PAINLEVEL_OUTOF10: 2

## 2024-12-03 NOTE — ED PROVIDER NOTES
[facility] EMERGENCY DEPARTMENT  EMERGENCY DEPARTMENT ENCOUNTER      Pt Name: Memo Medellin  MRN: [id number]  Birthdate 1970  Date of evaluation: 12/3/24      CHIEF COMPLAINT       Chief Complaint   Patient presents with    Diarrhea    Vomiting    Abdominal Pain     HISTORY OF PRESENT ILLNESS   HPI 54 y.o. male presents with c/o abdominal pain, vomiting, and diarrhea.   Pain has been intermittent for the last few months.  Current episode started 5 days ago.  Pain is in the LLQ.  Mild in intensity. .     REVIEW OF SYSTEMS       Review of Systems   Constitutional:  Negative for fever.   Respiratory:  Negative for cough.    Gastrointestinal:  Positive for abdominal pain and diarrhea.   Genitourinary:  Negative for dysuria and hematuria.   Musculoskeletal:  Negative for back pain.   Skin:  Negative for rash.       PAST MEDICAL HISTORY     Past Medical History:   Diagnosis Date    Chronic systolic CHF (congestive heart failure) ([facility] 4/27/2022    Diabetic peripheral neuropathy ([facility]) 1/14/2014    Essential hypertension 4/27/2022    Type II or unspecified type diabetes mellitus without mention of complication, not stated as uncontrolled        SURGICAL HISTORY       Past Surgical History:   Procedure Laterality Date    HAND REPLANTATION Left 6/7/89       CURRENT MEDICATIONS       Discharge Medication List as of 12/3/2024  3:56 PM        CONTINUE these medications which have NOT CHANGED    Details   gabapentin (NEURONTIN) 100 MG capsule Take 1 capsule by mouth 2 times daily for 60 days., Disp-60 capsule, R-1Normal      Insulin Degludec (TRESIBA FLEXTOUCH) 200 UNIT/ML SOPN inject 50 units subcutaneously once dailyinject 65 units subcutaneously once daily, Disp-9 mL, R-2Adjust Sig      rosuvastatin (CRESTOR) 20 MG tablet TAKE 1 TABLET BY MOUTH EVERY DAY, Disp-90 tablet, R-1ZERO refills remain on this prescription. Your patient is requesting advance approval of refills for this medication to PREVENT ANY MISSED

## 2025-02-06 ENCOUNTER — TELEPHONE (OUTPATIENT)
Dept: GASTROENTEROLOGY | Age: 55
End: 2025-02-06

## 2025-02-06 ENCOUNTER — HOSPITAL ENCOUNTER (OUTPATIENT)
Age: 55
Setting detail: SPECIMEN
Discharge: HOME | End: 2025-02-06

## 2025-02-06 LAB
CHOLEST SERPL-MCNC: 138 MG/DL (ref 0–199)
CHOLESTEROL/HDL RATIO: 3.4
HDLC SERPL-MCNC: 41 MG/DL
LDLC SERPL CALC-MCNC: 72 MG/DL (ref 0–100)
TRIGL SERPL-MCNC: 126 MG/DL (ref 0–149)
VLDLC SERPL CALC-MCNC: 25 MG/DL (ref 1–30)

## 2025-03-19 ENCOUNTER — PREP FOR PROCEDURE (OUTPATIENT)
Dept: GASTROENTEROLOGY | Age: 55
End: 2025-03-19

## 2025-03-19 ENCOUNTER — OFFICE VISIT (OUTPATIENT)
Dept: GASTROENTEROLOGY | Age: 55
End: 2025-03-19
Payer: COMMERCIAL

## 2025-03-19 VITALS
BODY MASS INDEX: 28.21 KG/M2 | DIASTOLIC BLOOD PRESSURE: 91 MMHG | WEIGHT: 208 LBS | OXYGEN SATURATION: 96 % | TEMPERATURE: 97.2 F | RESPIRATION RATE: 16 BRPM | SYSTOLIC BLOOD PRESSURE: 138 MMHG | HEART RATE: 85 BPM

## 2025-03-19 DIAGNOSIS — R63.4 WEIGHT LOSS: ICD-10-CM

## 2025-03-19 DIAGNOSIS — R76.8 POSITIVE HEPATITIS C ANTIBODY TEST: Primary | ICD-10-CM

## 2025-03-19 DIAGNOSIS — R11.0 NAUSEA: ICD-10-CM

## 2025-03-19 DIAGNOSIS — K74.00 LIVER FIBROSIS: ICD-10-CM

## 2025-03-19 DIAGNOSIS — R19.7 DIARRHEA, UNSPECIFIED TYPE: ICD-10-CM

## 2025-03-19 DIAGNOSIS — R63.4 LOSS OF WEIGHT: ICD-10-CM

## 2025-03-19 PROCEDURE — 3080F DIAST BP >= 90 MM HG: CPT | Performed by: INTERNAL MEDICINE

## 2025-03-19 PROCEDURE — 3075F SYST BP GE 130 - 139MM HG: CPT | Performed by: INTERNAL MEDICINE

## 2025-03-19 PROCEDURE — 99214 OFFICE O/P EST MOD 30 MIN: CPT | Performed by: INTERNAL MEDICINE

## 2025-03-19 ASSESSMENT — ENCOUNTER SYMPTOMS
TROUBLE SWALLOWING: 0
ABDOMINAL DISTENTION: 0
BLOOD IN STOOL: 0
CHOKING: 0
RECTAL PAIN: 0
NAUSEA: 0
ABDOMINAL PAIN: 0
VOMITING: 0
WHEEZING: 0
CONSTIPATION: 0
VOICE CHANGE: 0
SORE THROAT: 0
COUGH: 0
ANAL BLEEDING: 0
DIARRHEA: 1

## 2025-03-19 NOTE — TELEPHONE ENCOUNTER
Procedure scheduled/Dr Vaughn  Procedure:Colonoscopy/EGD  Dx:Colon cancer screening   Date:7/31/25  Time:10:00 am / Arrive: 8:00 am  Hospital:[facility] phone call:TBD  Bowel Prep instructions given:Shari   In office/via phone: OV  Clearance needed:WARFARIN-SENT TO CONNIE-ALEX RODRIGUEZ- [phone]-FAX  [phone]-OFFICE  GL1:-NONE

## 2025-03-19 NOTE — PROGRESS NOTES
COLONOSCOPY W/ OR W/O BIOPSY        Will proceed with above       Medication where reviewed, side effects from the GI medication were reviewed with the patient  We did refill the GI medications            Diet/life style/natural hx /complication of the dx were all explained in details   Past medical, past surgical, social history, psychiatric history, medications or allergies, all reviewed and  updated    Thank you for allowing me to participate in the care of Mr. Medellin. For any further questions please do not hesitate to contact me.    I have reviewed and agree with the ROS entered by the MA/RN.     Note is dictated utilizing voice recognition software. Unfortunately this leads to occasional typographical errors. Please contact our office if you have any questions.  This note is created with the assistance of the speech recognition program. While intending to generate a document that actually reflects the content of the visit, it can still have some errors including those of syntax and sound-a-like substitutions which may escape proof reading. Actual meaning can be extrapolated by contextual inference.    Skyla Vaughn MD  [facility] Gastroenterology  O: #[phone]

## 2025-03-20 RX ORDER — POLYETHYLENE GLYCOL 3350, SODIUM SULFATE ANHYDROUS, SODIUM BICARBONATE, SODIUM CHLORIDE, POTASSIUM CHLORIDE 236; 22.74; 6.74; 5.86; 2.97 G/4L; G/4L; G/4L; G/4L; G/4L
4 POWDER, FOR SOLUTION ORAL ONCE
Qty: 1 ML | Refills: 0 | Status: SHIPPED | OUTPATIENT
Start: 2025-03-20 | End: 2025-03-20

## 2025-03-20 RX ORDER — BISACODYL 5 MG
TABLET, DELAYED RELEASE (ENTERIC COATED) ORAL
Qty: 4 TABLET | Refills: 0 | Status: SHIPPED | OUTPATIENT
Start: 2025-03-20

## 2025-06-10 ENCOUNTER — HOSPITAL ENCOUNTER (OUTPATIENT)
Age: 55
Setting detail: SPECIMEN
Discharge: HOME | End: 2025-06-10

## 2025-06-10 DIAGNOSIS — R19.7 DIARRHEA, UNSPECIFIED TYPE: ICD-10-CM

## 2025-06-11 LAB
GLIADIN IGA SER IA-ACNC: 2.3 U/ML
GLIADIN IGG SER IA-ACNC: <0.4 U/ML
IGA SERPL-MCNC: 188 MG/DL (ref 70–400)
TTG IGA SER IA-ACNC: 0.5 U/ML

## 2025-07-01 ENCOUNTER — APPOINTMENT (OUTPATIENT)
Dept: CT IMAGING | Age: 55
End: 2025-07-01
Payer: MEDICARE

## 2025-07-01 ENCOUNTER — HOSPITAL ENCOUNTER (EMERGENCY)
Age: 55
Discharge: HOME | End: 2025-07-02
Attending: EMERGENCY MEDICINE
Payer: MEDICARE

## 2025-07-01 DIAGNOSIS — R19.7 NAUSEA VOMITING AND DIARRHEA: Primary | ICD-10-CM

## 2025-07-01 DIAGNOSIS — L03.012 CELLULITIS OF FINGER OF LEFT HAND: ICD-10-CM

## 2025-07-01 DIAGNOSIS — R11.2 NAUSEA VOMITING AND DIARRHEA: Primary | ICD-10-CM

## 2025-07-01 LAB
ALBUMIN SERPL-MCNC: 4.2 G/DL (ref 3.5–5.2)
ALP SERPL-CCNC: 97 U/L (ref 40–129)
ALT SERPL-CCNC: 34 U/L (ref 10–50)
ANION GAP SERPL CALCULATED.3IONS-SCNC: 14 MMOL/L (ref 9–16)
AST SERPL-CCNC: 37 U/L (ref 10–50)
BASOPHILS # BLD: 0 K/UL (ref 0–0.2)
BASOPHILS NFR BLD: 0 % (ref 0–2)
BILIRUB DIRECT SERPL-MCNC: 0.2 MG/DL (ref 0–0.3)
BILIRUB INDIRECT SERPL-MCNC: 0.4 MG/DL (ref 0–1)
BILIRUB SERPL-MCNC: 0.6 MG/DL (ref 0–1.2)
BNP SERPL-MCNC: 383 PG/ML (ref 0–300)
BUN SERPL-MCNC: 24 MG/DL (ref 6–20)
CALCIUM SERPL-MCNC: 8.9 MG/DL (ref 8.6–10.4)
CHLORIDE SERPL-SCNC: 106 MMOL/L (ref 98–107)
CO2 SERPL-SCNC: 20 MMOL/L (ref 20–31)
CREAT SERPL-MCNC: 1.1 MG/DL (ref 0.7–1.2)
EOSINOPHIL # BLD: 0 K/UL (ref 0–0.4)
EOSINOPHILS RELATIVE PERCENT: 0 % (ref 0–4)
ERYTHROCYTE [DISTWIDTH] IN BLOOD BY AUTOMATED COUNT: 13.4 % (ref 11.5–14.9)
GFR, ESTIMATED: 78 ML/MIN/1.73M2
GLUCOSE BLD-MCNC: 190 MG/DL (ref 75–110)
GLUCOSE SERPL-MCNC: 206 MG/DL (ref 74–99)
HCT VFR BLD AUTO: 41.9 % (ref 41–53)
HGB BLD-MCNC: 13.8 G/DL (ref 13.5–17.5)
IMM GRANULOCYTES # BLD AUTO: 0.08 K/UL (ref 0–0.3)
IMM GRANULOCYTES NFR BLD: 1 %
INR PPP: 2.3
LIPASE SERPL-CCNC: 12 U/L (ref 13–60)
LYMPHOCYTES NFR BLD: 0.16 K/UL (ref 1–4.8)
LYMPHOCYTES RELATIVE PERCENT: 2 % (ref 24–44)
MCH RBC QN AUTO: 28.3 PG (ref 26–34)
MCHC RBC AUTO-ENTMCNC: 32.9 G/DL (ref 31–37)
MCV RBC AUTO: 85.9 FL (ref 80–100)
MONOCYTES NFR BLD: 0.72 K/UL (ref 0.1–1.3)
MONOCYTES NFR BLD: 9 % (ref 1–7)
MORPHOLOGY: NORMAL
NEUTROPHILS NFR BLD: 88 % (ref 36–66)
NEUTS SEG NFR BLD: 7.04 K/UL (ref 1.3–9.1)
NRBC BLD-RTO: 0 PER 100 WBC
PLATELET # BLD AUTO: 129 K/UL (ref 150–450)
PMV BLD AUTO: 10.3 FL (ref 8–13.5)
POTASSIUM SERPL-SCNC: 4.4 MMOL/L (ref 3.7–5.3)
PROT SERPL-MCNC: 6.9 G/DL (ref 6.6–8.7)
PROTHROMBIN TIME: 27.7 SEC (ref 11.8–14.6)
RBC # BLD AUTO: 4.88 M/UL (ref 4.21–5.77)
SODIUM SERPL-SCNC: 140 MMOL/L (ref 136–145)
TROPONIN I SERPL HS-MCNC: 19 NG/L (ref 0–22)
TROPONIN I SERPL HS-MCNC: 21 NG/L (ref 0–22)
WBC OTHER # BLD: 8 K/UL (ref 3.5–11)

## 2025-07-01 PROCEDURE — 99285 EMERGENCY DEPT VISIT HI MDM: CPT

## 2025-07-01 PROCEDURE — 6360000004 HC RX CONTRAST MEDICATION: Performed by: STUDENT IN AN ORGANIZED HEALTH CARE EDUCATION/TRAINING PROGRAM

## 2025-07-01 PROCEDURE — 96374 THER/PROPH/DIAG INJ IV PUSH: CPT

## 2025-07-01 PROCEDURE — 2580000003 HC RX 258: Performed by: STUDENT IN AN ORGANIZED HEALTH CARE EDUCATION/TRAINING PROGRAM

## 2025-07-01 PROCEDURE — 2500000003 HC RX 250 WO HCPCS: Performed by: STUDENT IN AN ORGANIZED HEALTH CARE EDUCATION/TRAINING PROGRAM

## 2025-07-01 PROCEDURE — 84484 ASSAY OF TROPONIN QUANT: CPT

## 2025-07-01 PROCEDURE — 82947 ASSAY GLUCOSE BLOOD QUANT: CPT

## 2025-07-01 PROCEDURE — 36415 COLL VENOUS BLD VENIPUNCTURE: CPT

## 2025-07-01 PROCEDURE — 80076 HEPATIC FUNCTION PANEL: CPT

## 2025-07-01 PROCEDURE — 6370000000 HC RX 637 (ALT 250 FOR IP): Performed by: STUDENT IN AN ORGANIZED HEALTH CARE EDUCATION/TRAINING PROGRAM

## 2025-07-01 PROCEDURE — 83880 ASSAY OF NATRIURETIC PEPTIDE: CPT

## 2025-07-01 PROCEDURE — 80048 BASIC METABOLIC PNL TOTAL CA: CPT

## 2025-07-01 PROCEDURE — 96375 TX/PRO/DX INJ NEW DRUG ADDON: CPT

## 2025-07-01 PROCEDURE — 93005 ELECTROCARDIOGRAM TRACING: CPT | Performed by: STUDENT IN AN ORGANIZED HEALTH CARE EDUCATION/TRAINING PROGRAM

## 2025-07-01 PROCEDURE — 85025 COMPLETE CBC W/AUTO DIFF WBC: CPT

## 2025-07-01 PROCEDURE — 71260 CT THORAX DX C+: CPT

## 2025-07-01 PROCEDURE — 85610 PROTHROMBIN TIME: CPT

## 2025-07-01 PROCEDURE — 6360000002 HC RX W HCPCS: Performed by: STUDENT IN AN ORGANIZED HEALTH CARE EDUCATION/TRAINING PROGRAM

## 2025-07-01 PROCEDURE — 83690 ASSAY OF LIPASE: CPT

## 2025-07-01 RX ORDER — ONDANSETRON 2 MG/ML
4 INJECTION INTRAMUSCULAR; INTRAVENOUS ONCE
Status: COMPLETED | OUTPATIENT
Start: 2025-07-01 | End: 2025-07-01

## 2025-07-01 RX ORDER — 0.9 % SODIUM CHLORIDE 0.9 %
100 INTRAVENOUS SOLUTION INTRAVENOUS ONCE
Status: COMPLETED | OUTPATIENT
Start: 2025-07-01 | End: 2025-07-01

## 2025-07-01 RX ORDER — IOPAMIDOL 755 MG/ML
75 INJECTION, SOLUTION INTRAVASCULAR
Status: COMPLETED | OUTPATIENT
Start: 2025-07-01 | End: 2025-07-01

## 2025-07-01 RX ORDER — SODIUM CHLORIDE 0.9 % (FLUSH) 0.9 %
10 SYRINGE (ML) INJECTION PRN
Status: DISCONTINUED | OUTPATIENT
Start: 2025-07-01 | End: 2025-07-02 | Stop reason: HOSPADM

## 2025-07-01 RX ORDER — 0.9 % SODIUM CHLORIDE 0.9 %
500 INTRAVENOUS SOLUTION INTRAVENOUS ONCE
Status: COMPLETED | OUTPATIENT
Start: 2025-07-01 | End: 2025-07-01

## 2025-07-01 RX ORDER — MAGNESIUM HYDROXIDE/ALUMINUM HYDROXICE/SIMETHICONE 120; 1200; 1200 MG/30ML; MG/30ML; MG/30ML
30 SUSPENSION ORAL ONCE
Status: COMPLETED | OUTPATIENT
Start: 2025-07-01 | End: 2025-07-01

## 2025-07-01 RX ADMIN — CEPHALEXIN 500 MG: 250 CAPSULE ORAL at 23:56

## 2025-07-01 RX ADMIN — IOPAMIDOL 75 ML: 755 INJECTION, SOLUTION INTRAVENOUS at 20:04

## 2025-07-01 RX ADMIN — FAMOTIDINE 20 MG: 10 INJECTION, SOLUTION INTRAVENOUS at 21:48

## 2025-07-01 RX ADMIN — ONDANSETRON 4 MG: 2 INJECTION, SOLUTION INTRAMUSCULAR; INTRAVENOUS at 19:45

## 2025-07-01 RX ADMIN — SODIUM CHLORIDE, PRESERVATIVE FREE 10 ML: 5 INJECTION INTRAVENOUS at 20:03

## 2025-07-01 RX ADMIN — SODIUM CHLORIDE 500 ML: 0.9 INJECTION, SOLUTION INTRAVENOUS at 19:49

## 2025-07-01 RX ADMIN — ALUMINUM HYDROXIDE, MAGNESIUM HYDROXIDE, AND SIMETHICONE 30 ML: 200; 200; 20 SUSPENSION ORAL at 21:50

## 2025-07-01 RX ADMIN — SODIUM CHLORIDE 100 ML: 9 INJECTION, SOLUTION INTRAVENOUS at 20:05

## 2025-07-01 ASSESSMENT — PAIN DESCRIPTION - LOCATION
LOCATION: CHEST
LOCATION: ABDOMEN

## 2025-07-01 ASSESSMENT — PAIN DESCRIPTION - ORIENTATION
ORIENTATION: LEFT
ORIENTATION: UPPER

## 2025-07-01 ASSESSMENT — PAIN SCALES - GENERAL
PAINLEVEL_OUTOF10: 3
PAINLEVEL_OUTOF10: 1
PAINLEVEL_OUTOF10: 0

## 2025-07-01 ASSESSMENT — PAIN DESCRIPTION - DESCRIPTORS: DESCRIPTORS: ACHING

## 2025-07-01 ASSESSMENT — PAIN - FUNCTIONAL ASSESSMENT: PAIN_FUNCTIONAL_ASSESSMENT: 0-10

## 2025-07-01 ASSESSMENT — LIFESTYLE VARIABLES
HOW OFTEN DO YOU HAVE A DRINK CONTAINING ALCOHOL: MONTHLY OR LESS
HOW MANY STANDARD DRINKS CONTAINING ALCOHOL DO YOU HAVE ON A TYPICAL DAY: 1 OR 2

## 2025-07-01 NOTE — ED PROVIDER NOTES
[facility] EMERGENCY DEPARTMENT  Emergency Department Encounter  Emergency Medicine Resident     Pt Name:Memo Medellin  MRN: [id number]  Birthdate 1970  Date of evaluation: 7/1/25  PCP:  Nani Corley MD  Note Started: 7:28 PM EDT      CHIEF COMPLAINT       Chief Complaint   Patient presents with    Chest Pain    Nausea    Diarrhea       HISTORY OF PRESENT ILLNESS  (Location/Symptom, Timing/Onset, Context/Setting, Quality, Duration, Modifying Factors, Severity.)      Memo Medellin is a 55 y.o. male who presents with left-sided rib pain after lying down approximately 1 hour prior to presentation to the ER.  Patient reports that around 9:00 this morning, he had sudden onset nausea and vomiting, along with abdominal pain, and has had this intermittently throughout the day.  He then tried to lie down to get some rest, and noticed that this left-sided chest pain.  Patient with previous medical history notable for multiple stents placed, previous CHF, and previous MI that occurred without him having symptoms.  Patient currently between cardiologists due to his cardiologist no longer taking his insurance, but has an upcoming appointment with the [facility] cardiology team.  Patient currently on Lasix for swelling of his bilateral lower extremities, doing well at this time.  Patient denies fevers, chills, does have a sick contact (his wife) with similar symptoms.  Patient is tolerating p.o. intake, though reports worse symptoms with ingestion of dairy.    Of note, prior to discharge, patient reports that he has had a blister that progressively enlarged and popped, and now some erythema of the affected finger on his left hand.  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  Patient denies purulent drainage.    PAST MEDICAL / SURGICAL / SOCIAL / FAMILY HISTORY      has a past medical history of Chronic systolic CHF (congestive

## 2025-07-01 NOTE — ED TRIAGE NOTES
Mode of arrival (squad #, walk in, police, etc) : Medic 41        Chief complaint(s): Chest pain, Diarrhea, Nausea        Arrival Note (brief scenario, treatment PTA, etc).: Pt c/o left sided chest pain x 1 hour that started today while he  was laying down. Pt states the pain does not radiate. Pt also c/o nausea and diarrhea today. Pt took 324mg of Aspirin before EMS arrival. Pt states the pain feels much better after taking the Aspirin. Pt denies shortness of breath at this time. Pt states he is a diabetic, blood sugar of 190 in triage. Pt A&Ox4.        C= \"Have you ever felt that you should Cut down on your drinking?\"  No  A= \"Have people Annoyed you by criticizing your drinking?\"  No  G= \"Have you ever felt bad or Guilty about your drinking?\"  No  E= \"Have you ever had a drink as an Eye-opener first thing in the morning to steady your nerves or to help a hangover?\"  No      Deferred []      Reason for deferring: N/A    *If yes to two or more: probable alcohol abuse.*

## 2025-07-02 VITALS
BODY MASS INDEX: 25.73 KG/M2 | HEART RATE: 99 BPM | WEIGHT: 190 LBS | OXYGEN SATURATION: 99 % | DIASTOLIC BLOOD PRESSURE: 63 MMHG | SYSTOLIC BLOOD PRESSURE: 115 MMHG | RESPIRATION RATE: 15 BRPM | TEMPERATURE: 98.2 F | HEIGHT: 72 IN

## 2025-07-02 RX ORDER — CEPHALEXIN 500 MG/1
500 CAPSULE ORAL 4 TIMES DAILY
Qty: 40 CAPSULE | Refills: 0 | Status: SHIPPED | OUTPATIENT
Start: 2025-07-02 | End: 2025-07-12

## 2025-07-02 ASSESSMENT — PAIN SCALES - GENERAL: PAINLEVEL_OUTOF10: 0

## 2025-07-02 NOTE — ED NOTES
Patient ambulated to the toilet with steady gait. Reports having another episode of diarrhea and describes a gurgling sensation in his abdomen.

## 2025-07-02 NOTE — DISCHARGE INSTRUCTIONS
You were seen tonight in the emergency department for chest pain, diarrhea, vomiting.  You underwent a cardiac workup, and had imaging done of your lungs, along with the blood vessels in your lungs to look for clots.  No clots were found, however several pulmonary nodules were found.  One of these is larger than 1 cm in size (1.2 cm), and should be monitored over time.  Please notify your primary care team of this so they can continue to monitor this in the outpatient setting.  At this time, you are stable for discharge home, please follow-up with your cardiology team to continue to monitor your cardiac health.    Additionally, you have been started on an antibiotic for the cellulitis of your finger.  Please continue to take this until you no longer have any, and if you need more, please notify your primary care team, as they can extend out the duration of your dose.

## 2025-07-03 LAB
EKG ATRIAL RATE: 111 BPM
EKG P AXIS: 41 DEGREES
EKG P-R INTERVAL: 130 MS
EKG Q-T INTERVAL: 312 MS
EKG QRS DURATION: 88 MS
EKG QTC CALCULATION (BAZETT): 424 MS
EKG R AXIS: -8 DEGREES
EKG T AXIS: 75 DEGREES
EKG VENTRICULAR RATE: 111 BPM

## 2025-07-24 ENCOUNTER — HOSPITAL ENCOUNTER (OUTPATIENT)
Dept: PREADMISSION TESTING | Age: 55
Discharge: HOME | End: 2025-07-28

## 2025-07-24 VITALS — WEIGHT: 200 LBS | BODY MASS INDEX: 27.09 KG/M2 | HEIGHT: 72 IN

## 2025-07-24 NOTE — PROGRESS NOTES
Pre-op Instructions For Out-Patient Endoscopy Surgery    Medication Instructions:  Please stop herbs and any supplements now (includes vitamins and minerals).    For these GLP-1 medications:  Dulaglutide (Trulicity), Exenatide (Byetta and Bydureon, Liraglutide (Victoza), Lixisenatide (Adlyxin), Semaglutide (Ozempic and Rybelsus), Tirzepatide (Mounjaro, Zepbound,Wegovy)- Anesthesia recommends to stop 1 week prior if taking weekly or 1 day prior if taking every 12 hours or daily. Please contact PCP if you have difficulty managing blood sugar for further instructions. NA    For these SGLT-2 medications: Canagliflozin (Invokana), Dapagliflozin (Farxiga), Empagliflozin (Jardiance), Ertugliflozin (Steglatro), Bexagliflozin (Brenzavvy)-Anesthesia recommends to stop 3 days prior to surgery. Please contact PCP if you have difficulty managing blood sugar for further instructions.  NA    Please contact your surgeon and prescribing physician for pre-op instructions for any blood thinners. Aspirin and Warfarin, he will be on Lovenox    If you have inhalers/aerosol treatments at home, please use them the morning of your surgery and bring the inhalers with you to the hospital. Has none    Please take the following medications the morning of your surgery with a sip of water:    gabapentin, metoprolol, Entresto    Surgery Instructions:  After midnight before surgery:  Do not eat or drink anything, including water, mints, gum, and hard candy.  You may brush your teeth without swallowing.  No smoking, chewing tobacco, or street drugs.       ** Please Follow Bowel Prep instructions if given by surgeon's office**    Please shower or bathe before surgery.       Please do not wear any cologne, lotion, powder, jewelry, piercings, perfume, makeup, nail polish, hair accessories, or hair spray on the day of surgery.  Wear loose comfortable clothing.    Leave your valuables at home but bring a payment source for any after-surgery

## 2025-07-24 NOTE — PROGRESS NOTES
Patient informs this nurse that he will be stopping Warfarain and aspirin Saturday 7/26 and will be started on Lovenox. He also relays that he saw his cardiologist this morning regarding this EGD/colonoscopy for blood thinner plan. Dr. Vaughn had requested cardiac clearance.  Patient stated he has not received any prep for his upcoming colonoscopy.  Dr. Vaughn's office was contacted and message was left.

## 2025-07-29 NOTE — PRE-PROCEDURE INSTRUCTIONS
Have you received your Prep? Any questions with prep instructions? Y  Only Clear Liquid Diet day before.Y  Nothing to eat after midnight day before procedure.Y  Are you taking any blood thinners? If so, you need to Stop.STOPPED  Remove any jewelry and body piercings.Y  Do you wear glasses? If so, please bring a case to store them in.  Are you having any Covid symptoms?N  Do you have any new rashes, infections, etc. that we should be aware of?N  Do you have a ride home the day of surgery? It cannot be a cab or medical transportation.Y  Verify surgery time/date and what time to arrive at hospital.[id number]

## 2025-07-30 ENCOUNTER — ANESTHESIA EVENT (OUTPATIENT)
Dept: ENDOSCOPY | Age: 55
End: 2025-07-30
Payer: MEDICARE

## 2025-07-31 ENCOUNTER — ANESTHESIA (OUTPATIENT)
Dept: ENDOSCOPY | Age: 55
End: 2025-07-31
Payer: MEDICARE

## 2025-07-31 ENCOUNTER — HOSPITAL ENCOUNTER (OUTPATIENT)
Age: 55
Setting detail: OUTPATIENT SURGERY
Discharge: HOME | End: 2025-07-31
Attending: INTERNAL MEDICINE | Admitting: INTERNAL MEDICINE
Payer: MEDICARE

## 2025-07-31 VITALS
SYSTOLIC BLOOD PRESSURE: 106 MMHG | HEIGHT: 72 IN | RESPIRATION RATE: 13 BRPM | WEIGHT: 200 LBS | TEMPERATURE: 97 F | OXYGEN SATURATION: 98 % | DIASTOLIC BLOOD PRESSURE: 73 MMHG | HEART RATE: 69 BPM | BODY MASS INDEX: 27.09 KG/M2

## 2025-07-31 DIAGNOSIS — R19.7 DIARRHEA: ICD-10-CM

## 2025-07-31 DIAGNOSIS — R63.4 LOSS OF WEIGHT: ICD-10-CM

## 2025-07-31 DIAGNOSIS — R11.0 NAUSEA: ICD-10-CM

## 2025-07-31 LAB — GLUCOSE BLD-MCNC: 140 MG/DL (ref 75–110)

## 2025-07-31 PROCEDURE — 45380 COLONOSCOPY AND BIOPSY: CPT | Performed by: INTERNAL MEDICINE

## 2025-07-31 PROCEDURE — 82947 ASSAY GLUCOSE BLOOD QUANT: CPT

## 2025-07-31 PROCEDURE — 43239 EGD BIOPSY SINGLE/MULTIPLE: CPT | Performed by: INTERNAL MEDICINE

## 2025-07-31 PROCEDURE — 3700000001 HC ADD 15 MINUTES (ANESTHESIA): Performed by: INTERNAL MEDICINE

## 2025-07-31 PROCEDURE — 3700000000 HC ANESTHESIA ATTENDED CARE: Performed by: INTERNAL MEDICINE

## 2025-07-31 PROCEDURE — 6360000002 HC RX W HCPCS: Performed by: ANESTHESIOLOGY

## 2025-07-31 PROCEDURE — 45385 COLONOSCOPY W/LESION REMOVAL: CPT | Performed by: INTERNAL MEDICINE

## 2025-07-31 PROCEDURE — 3609012400 HC EGD TRANSORAL BIOPSY SINGLE/MULTIPLE: Performed by: INTERNAL MEDICINE

## 2025-07-31 PROCEDURE — 6360000002 HC RX W HCPCS: Performed by: NURSE ANESTHETIST, CERTIFIED REGISTERED

## 2025-07-31 PROCEDURE — 3609010600 HC COLONOSCOPY POLYPECTOMY SNARE/COLD BIOPSY: Performed by: INTERNAL MEDICINE

## 2025-07-31 PROCEDURE — 7100000011 HC PHASE II RECOVERY - ADDTL 15 MIN: Performed by: INTERNAL MEDICINE

## 2025-07-31 PROCEDURE — 2709999900 HC NON-CHARGEABLE SUPPLY: Performed by: INTERNAL MEDICINE

## 2025-07-31 PROCEDURE — 88305 TISSUE EXAM BY PATHOLOGIST: CPT

## 2025-07-31 PROCEDURE — 7100000010 HC PHASE II RECOVERY - FIRST 15 MIN: Performed by: INTERNAL MEDICINE

## 2025-07-31 PROCEDURE — 2580000003 HC RX 258: Performed by: ANESTHESIOLOGY

## 2025-07-31 RX ORDER — SODIUM CHLORIDE 9 MG/ML
INJECTION, SOLUTION INTRAVENOUS CONTINUOUS
Status: DISCONTINUED | OUTPATIENT
Start: 2025-07-31 | End: 2025-07-31 | Stop reason: HOSPADM

## 2025-07-31 RX ORDER — PROPOFOL 10 MG/ML
INJECTION, EMULSION INTRAVENOUS
Status: DISCONTINUED | OUTPATIENT
Start: 2025-07-31 | End: 2025-07-31 | Stop reason: SDUPTHER

## 2025-07-31 RX ORDER — SIMETHICONE 40MG/0.6ML
SUSPENSION, DROPS(FINAL DOSAGE FORM)(ML) ORAL PRN
Status: DISCONTINUED | OUTPATIENT
Start: 2025-07-31 | End: 2025-07-31 | Stop reason: ALTCHOICE

## 2025-07-31 RX ORDER — LIDOCAINE HYDROCHLORIDE 20 MG/ML
INJECTION, SOLUTION INFILTRATION; PERINEURAL
Status: DISCONTINUED | OUTPATIENT
Start: 2025-07-31 | End: 2025-07-31 | Stop reason: SDUPTHER

## 2025-07-31 RX ORDER — LIDOCAINE HYDROCHLORIDE 10 MG/ML
1 INJECTION, SOLUTION EPIDURAL; INFILTRATION; INTRACAUDAL; PERINEURAL
Status: COMPLETED | OUTPATIENT
Start: 2025-07-31 | End: 2025-07-31

## 2025-07-31 RX ORDER — SODIUM CHLORIDE 9 MG/ML
INJECTION, SOLUTION INTRAVENOUS PRN
Status: DISCONTINUED | OUTPATIENT
Start: 2025-07-31 | End: 2025-07-31 | Stop reason: HOSPADM

## 2025-07-31 RX ORDER — SODIUM CHLORIDE 0.9 % (FLUSH) 0.9 %
5-40 SYRINGE (ML) INJECTION EVERY 12 HOURS SCHEDULED
Status: DISCONTINUED | OUTPATIENT
Start: 2025-07-31 | End: 2025-07-31 | Stop reason: HOSPADM

## 2025-07-31 RX ORDER — SODIUM CHLORIDE 0.9 % (FLUSH) 0.9 %
5-40 SYRINGE (ML) INJECTION PRN
Status: DISCONTINUED | OUTPATIENT
Start: 2025-07-31 | End: 2025-07-31 | Stop reason: HOSPADM

## 2025-07-31 RX ADMIN — SODIUM CHLORIDE: 900 INJECTION, SOLUTION INTRAVENOUS at 10:30

## 2025-07-31 RX ADMIN — PROPOFOL 150 MCG/KG/MIN: 10 INJECTION, EMULSION INTRAVENOUS at 10:36

## 2025-07-31 RX ADMIN — LIDOCAINE HYDROCHLORIDE 100 MG: 20 INJECTION, SOLUTION INFILTRATION; PERINEURAL at 10:36

## 2025-07-31 RX ADMIN — LIDOCAINE HYDROCHLORIDE 1 ML: 10 INJECTION, SOLUTION EPIDURAL; INFILTRATION; INTRACAUDAL; PERINEURAL at 08:11

## 2025-07-31 ASSESSMENT — PAIN - FUNCTIONAL ASSESSMENT
PAIN_FUNCTIONAL_ASSESSMENT: NONE - DENIES PAIN
PAIN_FUNCTIONAL_ASSESSMENT: ADULT NONVERBAL PAIN SCALE (NPVS)

## 2025-07-31 ASSESSMENT — ENCOUNTER SYMPTOMS
SHORTNESS OF BREATH: 0
APNEA: 0
COUGH: 0
TROUBLE SWALLOWING: 0
BACK PAIN: 0
SORE THROAT: 0

## 2025-07-31 NOTE — DISCHARGE INSTRUCTIONS
Sedation or General Anesthesia, Adult  Care After  Refer to this sheet in the next 24 hours. These instructions provide you with information on caring for yourself after your procedure. Your caregiver may also give you more specific instructions. Your treatment has been planned according to current medical practices, but problems sometimes occur. Call your caregiver if you have any problems or questions after your procedure.   HOME CARE INSTRUCTIONS   Do not participate in any activities that require you to be alert or coordinated. Do not:  Drive.  Swim.  Ride a bicycle.  Operate heavy machinery.  Cook.  Use power tools.  Climb ladders.  Work at heights.  Take a bath.  Do not drink alcohol.  Do not make any important decisions or sign legal documents.  Stay with an adult.  The first meal following your procedure should be light and small. Avoid solid foods if you feel sick to your stomach (nauseous) or if you throw up (vomit).  Drink enough fluids to keep your urine clear or pale yellow.  Only take your usual medicines or new medicines if your caregiver approves them.  Only take over-the-counter or prescription medicines for pain, discomfort, or fever as directed by your caregiver.  Keep all follow-up appointments as directed by your caregiver.  SEEK IMMEDIATE MEDICAL CARE IF:   You are not feeling normal or behaving normally after 24 hours.  You have persistent nausea and vomiting.  You are unable to drink fluids or eat food.  You have difficulty urinating.  You have difficulty breathing or speaking.  You have blue or gray skin.  There is difficulty waking or you cannot be woken up.  You have heavy bleeding, redness, or a lot of swelling where the sedative or anesthesia entered your skin (intravenous site).  You have a rash.  MAKE SURE YOU:  Understand these instructions.  Will watch your condition.  Will get help right away if you are not doing well or get worse.  Document Released: 12/18/2006 Document Revised:  will discuss the results with you either the day of surgery or the following day.   Call Your Doctor   After arriving home, contact your doctor if any of the following occurs:   Signs of infection, including fever and chills   Redness, swelling, increasing pain, excessive bleeding, or discharge from the rectum (Up to cup of blood per day can be expected for up to 3-4 days following your polypectomy.)   Black, tarry stools   Severe abdominal pain   Hard, swollen abdomen   Inability to pass gas or stool   Cough , shortness of breath, chest pain, or severe nausea or vomiting   New, unexplained symptoms   In case of emergency,  CALL 911  .     Last Reviewed: December 2010 Deyvi Aguirre MD   Updated: 4/7/2011

## 2025-07-31 NOTE — H&P
HISTORY and PHYSICAL  [facility]       NAME:  Memo Medellin  MRN: [id number]   YOB: 1970   Date: 7/31/2025   Age: 55 y.o.  Gender: male       COMPLAINT AND PRESENT HISTORY:     Memo Medellin is 55 y.o.,  male, presents for ESOPHAGOGASTRODUODENOSCOPY BIOPSY, COLONOSCOPY DIAGNOSTIC   Primary dx: Diarrhea [R19.7]  Loss of weight [R63.4]  Nausea [R11.0].    Office note per Dr Vaughn on 3/19/2025  HISTORY OF PRESENT ILLNESS: Mr.Rodney ZACH Medellin is a 54 y.o. male , referred for evaluation of  Hep C expusre, diarrhea, vomiting      Was seen in the past for hep C expure   In Dec had diarrhea and vomiting   Since then still having diarrhea but not the vomiting  Came to the hospital  CAT scan of the abdomen was negative  Labs showed normal H&H and normal LFTs  Lost 20 pounds since then  Still having the diarrhea on and off no relationship to certain kind of food  No bleeding no fever no chills    UPDATE 7/31/2025     Memo Medellin is 55 y.o.,   male, here for ESOPHAGOGASTRODUODENOSCOPY BIOPSY, COLONOSCOPY DIAGNOSTIC     Pt is being seen for :  Diarrhea [R19.7]  Loss of weight [R63.4]  Nausea [R11.0]    No prior colonoscopy or EGD done before.     Denies abdominal pain  Denies  dysphagia  Reports h/o heartburn improved on bentyl.   Reports nausea, vomiting, diarrhea improved.  Denies constipation.  Denies blood in stool, dark tarry stools.  Denies changes in appetite and unintended weight loss.  Weight fluctuates if experiencing diarrhea.  Deneis family history of colon or esophageal cancer.  Denies hx of smoking.     Denies chest pain/pressure, palpitations, SOB, recent URI, fever or chills.       Completed and followed prescribed prep. YES    Patient denies any personal or family problems with anesthesia.    Review of additional significant medical hx: CHF, HTN, DM2, Mechanical MV replacement-coumadin  Follow with [facility] Last visit 7/24/2025 with

## 2025-07-31 NOTE — OP NOTE
PROCEDURE NOTE    DATE OF PROCEDURE: 7/31/2025     SURGEON: Skyla Vaughn MD  Facility: \"[facility]  ASSISTANT: None  Anesthesia: MAC  PREOPERATIVE DIAGNOSIS:   Diarrhea  Nausea  Weight loss      Diagnosis:    Gastritis with erosion in the antrum, biopsies were taken    Edema and erythema of the duodenum biopsies were taken        POSTOPERATIVE DIAGNOSIS: As described below    OPERATION: Upper GI endoscopy with Biopsy    ANESTHESIA: Moderate Sedation     ESTIMATED BLOOD LOSS: Less than 50 ml    COMPLICATIONS: None.     SPECIMENS:  Was Obtained: As above    HISTORY: The patient is a 55 y.o. year old male with history of above preop diagnosis.  I recommended esophagogastroduodenoscopy with possible biopsy and I explained the risk, benefits, expected outcome, and alternatives to the procedure.  Risks included but are not limited to bleeding, infection, respiratory distress, hypotension, and perforation of the esophagus, stomach, or duodenum.  Patient understands and is in agreement.      The patient was counseled at length about the risks of hemant Covid-19 during their perioperative period and any recovery window from their procedure.  The patient was made aware that hemant Covid-19  may worsen their prognosis for recovering from their procedure  and lend to a higher morbidity and/or mortality risk.  All material risks, benefits, and reasonable alternatives including postponing the procedure were discussed. The patient does wish to proceed with the procedure at this time.         PROCEDURE: The patient was given IV conscious sedation.  The patient's SPO2 remained above 90% throughout the procedure.The gastroscope was inserted orally and advanced under direct vision through the esophagus, through the stomach, through the pylorus, and into the descending duodenum.      Post sedation note :The patient's SPO2 remained above 90% throughout the procedure.the vital signs remained stable , and no immediate

## 2025-07-31 NOTE — ANESTHESIA POSTPROCEDURE EVALUATION
Department of Anesthesiology  Postprocedure Note    Patient: Memo Medellin  MRN: [id number]  YOB: 1970  Date of evaluation: 7/31/2025    Procedure Summary       Date: 07/31/25 Room / Location: [place] [id number] / [facility]    Anesthesia Start: 1030 Anesthesia Stop: 1103    Procedures:       ESOPHAGOGASTRODUODENOSCOPY BIOPSY (Esophagus)      COLONOSCOPY POLYPECTOMY SNARE/BIOPSY Diagnosis:       Diarrhea      Loss of weight      Nausea      (Diarrhea [R19.7])      (Loss of weight [R63.4])      (Nausea [R11.0])    Surgeons: Skyla Vaughn MD Responsible Provider: Claudia Acuna MD    Anesthesia Type: general, TIVA ASA Status: 3            Anesthesia Type: No value filed.    Laura Phase I: Laura Score: 10    Laura Phase II: Laura Score: 10    Anesthesia Post Evaluation    Comments: POST- ANESTHESIA EVALUATION       Pt Name: Memo Medellin  MRN: [id number]  YOB: 1970  Date of evaluation: 7/31/2025  Time:  11:52 AM      /73   Pulse 69   Temp 97 °F (36.1 °C)   Resp 13   Ht 1.829 m (6')   Wt 90.7 kg (200 lb)   SpO2 98%   BMI 27.12 kg/m²      Consciousness Level  Awake  Cardiopulmonary Status  Stable  Pain Adequately Treated YES  Nausea / Vomiting  NO  Adequate Hydration  YES  Anesthesia Related Complications NONE      Electronically signed by Claudia Acuna MD on 7/31/2025 at 11:52 AM      No notable events documented.

## 2025-07-31 NOTE — ANESTHESIA PRE PROCEDURE
Department of Anesthesiology  Preprocedure Note       Name:  Memo Medellin   Age:  55 y.o.  :  1970                                          MRN:  [id number]         Date:  2025      Surgeon: Surgeon(s):  Skyla Vaughn MD    Procedure: Procedure(s):  ESOPHAGOGASTRODUODENOSCOPY BIOPSY  COLONOSCOPY DIAGNOSTIC    Medications prior to admission:   Prior to Admission medications    Medication Sig Start Date End Date Taking? Authorizing Provider   JARDIANCE 25 MG tablet Take 1 tablet by mouth daily 25  Yes Renetta Beck MD   dicyclomine (BENTYL) 10 MG capsule Take 1 capsule by mouth 4 times daily (before meals and nightly) 25  Yes Nani Corley MD   ezetimibe (ZETIA) 10 MG tablet Take 1 tablet by mouth daily 25  Yes Nani Corley MD   furosemide (LASIX) 40 MG tablet Take 1 tablet by mouth daily 25  Yes Nani Corley MD   Insulin Degludec (TRESIBA FLEXTOUCH) 200 UNIT/ML SOPN inject 65 units subcutaneously once daily 25  Yes Nani Corley MD   ENTRESTO 49-51 MG per tablet Take 1 tablet by mouth 2 times daily 25  Yes Nani Corley MD   rosuvastatin (CRESTOR) 20 MG tablet Take 1 tablet by mouth daily 25  Yes Onelia Mcghee APRN - CNP   gabapentin (NEURONTIN) 100 MG capsule Take 1 capsule by mouth 2 times daily for 60 days. 25 Yes Onelia Mcghee APRN - CNP   metoprolol succinate (TOPROL XL) 25 MG extended release tablet Take 1 tablet by mouth daily 3/18/25  Yes Nani Corley MD   Acetaminophen (TYLENOL PO) Take by mouth   Yes ProviderAaron MD   glimepiride (AMARYL) 4 MG tablet TAKE 1 TABLET BY MOUTH DAILY WITH DINNER 10/10/24  Yes Renetta Beck MD   polyethylene glycol-electrolytes (NULYTELY) 420 g solution Take as directed for bowel prep for colonoscopy  Patient not taking: Reported on 2025   Sofia Mo PA-C   bisacodyl 5 MG EC tablet Take as directed by physician office for bowel prep  Patient not

## 2025-07-31 NOTE — OP NOTE
PROCEDURE NOTE    DATE OF PROCEDURE: 7/31/2025    SURGEON: Skyla Vaughn MD  Facility : \"[facility]  ASSISTANT: None  Anesthesia: MAC  PREOPERATIVE DIAGNOSIS:   Bloating  Diarrhea  Weight loss        POSTOPERATIVE DIAGNOSIS: as described below    OPERATION: Total colonoscopy     ANESTHESIA: Moderate Sedation    ESTIMATED BLOOD LOSS: less than 50     COMPLICATIONS: None.     SPECIMENS:  Was Obtained:     Random colon biopsies because of the diarrhea    Sessile 1 cm polyp in the sigmoid removed with cold snare.          HISTORY: The patient is a 55 y.o. year old male with history of above preop diagnosis.  I recommended colonoscopy with possible biopsy or polypectomy and I explained the risk, benefits, expected outcome, and alternatives to the procedure.  Risks included but are not limited to bleeding, infection, respiratory distress, hypotension, and perforation of the colon and possibility of missing a lesion.  The patient understands and is in agreement.        The patient was counseled at length about the risks of hemant Covid-19 during their perioperative period and any recovery window from their procedure.  The patient was made aware that hemant Covid-19  may worsen their prognosis for recovering from their procedure  and lend to a higher morbidity and/or mortality risk.  All material risks, benefits, and reasonable alternatives including postponing the procedure were discussed. The patient does wish to proceed with the procedure at this time.       PROCEDURE: The patient was given IV conscious sedation.  The patient's SPO2 remained above 90% throughout the procedure.     The colonoscope was inserted per rectum and advanced under direct vision to the cecum without difficulty.      Post sedation note :The patient's SPO2 remained above 90% throughout the procedure.the vital signs remained stable , and no immediate complication form the procedure noted, patient will be ready for d/c when criteria

## 2025-08-03 LAB — SURGICAL PATHOLOGY REPORT: NORMAL

## 2025-08-04 ENCOUNTER — TELEPHONE (OUTPATIENT)
Dept: GASTROENTEROLOGY | Age: 55
End: 2025-08-04

## 2025-08-04 ENCOUNTER — RESULTS FOLLOW-UP (OUTPATIENT)
Dept: GASTROENTEROLOGY | Age: 55
End: 2025-08-04

## 2025-08-04 DIAGNOSIS — K29.80 PEPTIC DUODENITIS: Primary | ICD-10-CM

## 2025-08-04 DIAGNOSIS — K29.60 CHEMICAL GASTRITIS: ICD-10-CM

## 2025-08-04 DIAGNOSIS — K29.80 PEPTIC DUODENITIS: ICD-10-CM

## 2025-08-04 RX ORDER — PANTOPRAZOLE SODIUM 40 MG/1
40 TABLET, DELAYED RELEASE ORAL 2 TIMES DAILY
Qty: 30 TABLET | Refills: 2 | Status: SHIPPED | OUTPATIENT
Start: 2025-08-04 | End: 2025-08-04 | Stop reason: SDUPTHER

## 2025-08-04 RX ORDER — PANTOPRAZOLE SODIUM 40 MG/1
40 TABLET, DELAYED RELEASE ORAL 2 TIMES DAILY
Qty: 60 TABLET | Refills: 2 | Status: SHIPPED | OUTPATIENT
Start: 2025-08-04

## 2025-08-11 ENCOUNTER — OFFICE VISIT (OUTPATIENT)
Dept: GASTROENTEROLOGY | Age: 55
End: 2025-08-11
Payer: MEDICARE

## 2025-08-11 VITALS
HEART RATE: 65 BPM | RESPIRATION RATE: 16 BRPM | BODY MASS INDEX: 27.12 KG/M2 | TEMPERATURE: 97.1 F | SYSTOLIC BLOOD PRESSURE: 116 MMHG | WEIGHT: 200 LBS | OXYGEN SATURATION: 99 % | DIASTOLIC BLOOD PRESSURE: 83 MMHG

## 2025-08-11 DIAGNOSIS — K29.80 PEPTIC DUODENITIS: Primary | ICD-10-CM

## 2025-08-11 DIAGNOSIS — K29.60 CHEMICAL GASTRITIS: ICD-10-CM

## 2025-08-11 DIAGNOSIS — Z20.5 EXPOSURE TO HEPATITIS C: ICD-10-CM

## 2025-08-11 DIAGNOSIS — R76.8 POSITIVE HEPATITIS C ANTIBODY TEST: ICD-10-CM

## 2025-08-11 PROCEDURE — G8427 DOCREV CUR MEDS BY ELIG CLIN: HCPCS | Performed by: PHYSICIAN ASSISTANT

## 2025-08-11 PROCEDURE — 3079F DIAST BP 80-89 MM HG: CPT | Performed by: PHYSICIAN ASSISTANT

## 2025-08-11 PROCEDURE — 1036F TOBACCO NON-USER: CPT | Performed by: PHYSICIAN ASSISTANT

## 2025-08-11 PROCEDURE — 3017F COLORECTAL CA SCREEN DOC REV: CPT | Performed by: PHYSICIAN ASSISTANT

## 2025-08-11 PROCEDURE — 3074F SYST BP LT 130 MM HG: CPT | Performed by: PHYSICIAN ASSISTANT

## 2025-08-11 PROCEDURE — 99213 OFFICE O/P EST LOW 20 MIN: CPT | Performed by: PHYSICIAN ASSISTANT

## 2025-08-11 PROCEDURE — G8419 CALC BMI OUT NRM PARAM NOF/U: HCPCS | Performed by: PHYSICIAN ASSISTANT

## (undated) DEVICE — FORCEPS BX L240CM JAW DIA2.8MM L CAP W/ NDL MIC MESH TOOTH

## (undated) DEVICE — GOWN,POLY REINFORCED,LG: Brand: MEDLINE

## (undated) DEVICE — BITEBLOCK 54FR W/ DENT RIM BLOX

## (undated) DEVICE — SINGLE USE AIR/WATER, SUCTION AND BIOPSY VALVES SET: Brand: ORCAPOD™

## (undated) DEVICE — ENDOSCOPIC KIT 1.1+ 10 FT OP4 CA DE

## (undated) DEVICE — SNARE ENDOSCP AD SM L240CM LOOP W1.3CM SHTH DIA2.4MM POLYP

## (undated) DEVICE — GAUZE,SPONGE,4"X4",16PLY,STRL,LF,10/TRAY: Brand: MEDLINE

## (undated) DEVICE — GLOVE SURG SZ 75 L12IN FNGR THK79MIL GRN LTX FREE